# Patient Record
Sex: MALE | Race: WHITE | NOT HISPANIC OR LATINO | Employment: FULL TIME | URBAN - METROPOLITAN AREA
[De-identification: names, ages, dates, MRNs, and addresses within clinical notes are randomized per-mention and may not be internally consistent; named-entity substitution may affect disease eponyms.]

---

## 2018-02-14 ENCOUNTER — OFFICE VISIT CONVERTED (OUTPATIENT)
Dept: ORTHOPEDIC SURGERY | Facility: CLINIC | Age: 28
End: 2018-02-14
Attending: ORTHOPAEDIC SURGERY

## 2019-07-26 ENCOUNTER — OFFICE VISIT CONVERTED (OUTPATIENT)
Dept: FAMILY MEDICINE CLINIC | Age: 29
End: 2019-07-26
Attending: FAMILY MEDICINE

## 2019-07-26 ENCOUNTER — HOSPITAL ENCOUNTER (OUTPATIENT)
Dept: OTHER | Facility: HOSPITAL | Age: 29
Discharge: HOME | End: 2019-07-26
Attending: FAMILY MEDICINE

## 2019-07-26 LAB
C DIFF TOX B STL QL CT TISS CULT: NEGATIVE
CONV 027 TOXIN: NEGATIVE

## 2019-08-01 ENCOUNTER — OFFICE VISIT CONVERTED (OUTPATIENT)
Dept: FAMILY MEDICINE CLINIC | Age: 29
End: 2019-08-01
Attending: FAMILY MEDICINE

## 2019-08-22 ENCOUNTER — OFFICE VISIT CONVERTED (OUTPATIENT)
Dept: FAMILY MEDICINE CLINIC | Age: 29
End: 2019-08-22
Attending: FAMILY MEDICINE

## 2019-11-26 ENCOUNTER — OFFICE VISIT CONVERTED (OUTPATIENT)
Dept: FAMILY MEDICINE CLINIC | Age: 29
End: 2019-11-26
Attending: FAMILY MEDICINE

## 2020-01-09 ENCOUNTER — OFFICE VISIT CONVERTED (OUTPATIENT)
Dept: FAMILY MEDICINE CLINIC | Age: 30
End: 2020-01-09
Attending: FAMILY MEDICINE

## 2020-04-15 ENCOUNTER — OFFICE VISIT CONVERTED (OUTPATIENT)
Dept: FAMILY MEDICINE CLINIC | Age: 30
End: 2020-04-15
Attending: FAMILY MEDICINE

## 2020-10-16 ENCOUNTER — OFFICE VISIT CONVERTED (OUTPATIENT)
Dept: FAMILY MEDICINE CLINIC | Age: 30
End: 2020-10-16
Attending: FAMILY MEDICINE

## 2020-10-23 ENCOUNTER — CONVERSION ENCOUNTER (OUTPATIENT)
Dept: FAMILY MEDICINE CLINIC | Age: 30
End: 2020-10-23

## 2020-10-24 ENCOUNTER — HOSPITAL ENCOUNTER (OUTPATIENT)
Dept: OTHER | Facility: HOSPITAL | Age: 30
Discharge: HOME | End: 2020-10-24
Attending: FAMILY MEDICINE

## 2020-10-27 LAB — SARS-COV-2 RNA SPEC QL NAA+PROBE: DETECTED

## 2021-05-16 VITALS — RESPIRATION RATE: 16 BRPM | WEIGHT: 290 LBS | BODY MASS INDEX: 39.28 KG/M2 | HEIGHT: 72 IN

## 2021-05-18 NOTE — PROGRESS NOTES
"Centers, Duane AJamila  1990     Office/Outpatient Visit    Visit Date: Thu, Jan 9, 2020 03:37 pm    Provider: Kari Zavaleta MD (Assistant: Tania Garces MA)    Location: [facility]        Electronically signed by Kari Zavaleta MD on  01/09/2020 04:29:11 PM                             Subjective:        CC: Robel is a 29 year old White male.  Patient presents today for one month follow up         HPI: Robel is here today to follow up on depression.  He was started on Lexapro at his annual physical last month after triggering a positive depression screen.  He first started having trouble with depression in his teens.  Has been on Prozac in the past but without much effect.  \"Some days feel normal and other days are still rachana down.\"  +Insomnia.  +depressed mood.  Motivation has been fine.  Concentration good.  No feelings of guilt or worthlessness.  No anhedonia.  Appetite is \"some days normal, some days not hungry.\"  Energy has been fine.  No SI.    ROS:     CONSTITUTIONAL:  Negative for fatigue and fever.      EYES:  Negative for blurred vision.      E/N/T:  Negative for diminished hearing, nasal congestion and bleeding gums.      CARDIOVASCULAR:  Negative for chest pain and palpitations.      RESPIRATORY:  Positive for dyspnea ( with heavy exertion ).   Negative for recent cough or pleuritic chest pain.      GASTROINTESTINAL:  Negative for abdominal pain, acid reflux symptoms, constipation, diarrhea, hematochezia, nausea and vomiting.      MUSCULOSKELETAL:  Negative for arthralgias and myalgias.      PSYCHIATRIC:  Positive for anxiety, depression and sleep disturbance.   Negative for crying spells, anhedonia, difficulty concentrating or suicidal thoughts.          Past Medical History / Family History / Social History:         Last Reviewed on 1/09/2020 04:21 PM by Kari Zavaleta    Past Medical History:             PAST MEDICAL HISTORY         Chicken pox     Hospitalizations: Never        "  Surgical History:     NONE         Family History:     Father: Coronary Artery Disease; Hypertension; Hyperlipidemia; Myocardial Infarction; OTHER (enter); Factor 5;  blood clots, pulmonary embolism     Mother: pulmonary embolism;  Depression;  Factor 5     Paternal Grandfather: Heart disease     Maternal Grandfather: Cancer (unspecified type)     Maternal Grandmother: Cancer (unspecified type);  Type 2 Diabetes         Social History:     Occupation: [employer], maintenance work;     Marital Status: Single     Children: None         Tobacco/Alcohol/Supplements:     Last Reviewed on 1/09/2020 04:21 PM by Kari Zavaleta    Tobacco: He has never smoked.          Alcohol: Patient has a past history of alcoholism.  His last drink was 84 days.      Caffeine:  He admits to consuming caffeine via tea ( 1 serving per day ).          Substance Abuse History:     Last Reviewed on 1/09/2020 04:21 PM by Kari Zavaleta    NEGATIVE         Mental Health History:     Last Reviewed on 1/09/2020 04:21 PM by Kari Zavaleta        Communicable Diseases (eg STDs):     Last Reviewed on 1/09/2020 04:21 PM by Kari Zavaleta        Current Problems:     Last Reviewed on 11/26/2019 03:54 PM by Kari Zavaleta    Other pulmonary embolism without acute cor pulmonale    Other primary thrombophilia    Encounter for general adult medical examination without abnormal findings    Encounter for screening for depression    Encounter for immunization    Major depressive disorder, recurrent, mild        Immunizations:     influenza, injectable, quadrivalent, preservative free (FLUZONE QUAD 1641-9497) 11/26/2019    DTaP 1992    DTaP 1992    DTaP 5/19/1993    DTaP 1992    DTaP 4/11/1997    Td adult 5/12/2003    Td adult 11/30/2015    PedvaxHIB (Hib PRP-OMP) 1992    PedvaxHIB (Hib PRP-OMP) 1992    PedvaxHIB (Hib PRP-OMP) 1992    PedvaxHIB (Hib PRP-OMP) 5/19/1993    Hep B (pedi/adol, 3-dose schedule)  6/19/2001    Hep B (pedi/adol, 3-dose schedule) 7/19/2001    Hep B (pedi/adol, 3-dose schedule) 2/6/2002    OPV  Poliovirus, live (oral) 1992    OPV  Poliovirus, live (oral) 1992    OPV  Poliovirus, live (oral) 5/19/1993    OPV  Poliovirus, live (oral) 4/11/1997    MMR  (Measles-Mumps-Rubella), live 5/19/1993    MMR  (Measles-Mumps-Rubella), live 4/11/1997        Allergies:     Last Reviewed on 11/26/2019 03:54 PM by Kari Zavaleta    No Known Allergies.        Current Medications:     Last Reviewed on 11/26/2019 03:54 PM by Kari Zavaleta    Flonase Allergy Relief  [once daily]    ZyrTEC  [po q daily]    escitalopram oxalate 5 mg oral tablet [take 1 tablet (5 mg) by oral route once daily]        Objective:        Vitals:         Current: 1/9/2020 3:40:42 PM    Ht:  5 ft, 10.5 in;  Wt: 304.6 lbs;  BMI: 43.1T: 98.7 F (oral);  BP: 138/88 mm Hg (left arm, sitting);  P: 97 bpm (left arm (BP Cuff), sitting);  sCr: 0.89 mg/dL;  GFR: 150.56        Exams:     PHYSICAL EXAM:     GENERAL: Vitals recorded well developed, well nourished;  well groomed;  no apparent distress;     EYES: extraocular movements intact; conjunctiva and cornea are normal; PERRLA;     E/N/T: OROPHARYNX:  normal mucosa, dentition, gingiva, and posterior pharynx;     RESPIRATORY: normal respiratory rate and pattern with no distress;     MUSCULOSKELETAL: normal gait; normal overall tone     PSYCHIATRIC: appropriate affect and demeanor; normal psychomotor function; normal speech pattern;         Assessment:         F33.0   Major depressive disorder, recurrent, mild       Z13.31   Encounter for screening for depression           ORDERS:         Meds Prescribed:       [Refilled] escitalopram oxalate 10 mg oral tablet [take 1 tablet (10 mg) by oral route once daily], #30 (thirty) tablets, Refills: 2 (two)         Lab Orders:       APPTO  Appointment need  (In-House)              Other Orders:         Depression screen positive and follow up  plan documented  (In-House)                      Plan:         Major depressive disorder, recurrent, mildSx improved but not yet with optimal sx control.  Still having trouble with insomnia and appetite issues.  Overall feels much better.  No adverse effects.  Increasing Lexapro to 10 mg and I will see him back in 3 months or sooner prn.    MIPS PHQ-9 Depression Screening: Completed form scanned and in chart; Total Score 7 Positive Depression Screen: Pharmacologic intervention initiated/modified     FOLLOW-UP: Schedule a follow-up visit in 3 months.:.  f/u depression with [facility]          Prescriptions:       [Refilled] escitalopram oxalate 10 mg oral tablet [take 1 tablet (10 mg) by oral route once daily], #30 (thirty) tablets, Refills: 2 (two)           Orders:       APPTO  Appointment need  (In-House)              Depression screen positive and follow up plan documented  (In-House)                  Patient Recommendations:        For  Major depressive disorder, recurrent, mild:    Schedule a follow-up visit in 3 months.                APPOINTMENT INFORMATION:        Monday Tuesday Wednesday Thursday Friday Saturday Sunday            Time:___________________AM  PM   Date:_____________________             Charge Capture:         Primary Diagnosis:     F33.0  Major depressive disorder, recurrent, mild           Orders:      04170  Office/outpatient visit; established patient, level 3  (In-House)            APPTO  Appointment need  (In-House)              Depression screen positive and follow up plan documented  (In-House)              Z13.31  Encounter for screening for depression

## 2021-05-18 NOTE — PROGRESS NOTES
"Centers, Duane AJamila  1990     Office/Outpatient Visit    Visit Date: Wed, Apr 15, 2020 03:41 pm    Provider: Kari Zavaleta MD (Assistant: Tania Garces MA)    Location: [facility]        Electronically signed by Kari Zavaleta MD on  04/15/2020 04:04:24 PM                             Subjective:        CC: Robel is a 30 year old White male.  Three month follow up         HPI: Robel's telehealth visit today is for f/u on depression.  We increased his Lexapro at last visit from 5 mg to 10 mg since he was having some ongoing issues with insomnia and depressed mood.  He initially had trouble with depression starting in his teens.  He has been on Prozac in the past but without much effect.  He feels much better.  Still having intermittent insomnia.  However, mood \"is much better.\"    ROS:     CONSTITUTIONAL:  Negative for fatigue and fever.      CARDIOVASCULAR:  Negative for chest pain and palpitations.      RESPIRATORY:  Negative for recent cough, dyspnea ( back to normal ) and pleuritic chest pain.      MUSCULOSKELETAL:  Negative for arthralgias and myalgias.      PSYCHIATRIC:  Positive for anxiety, depression and sleep disturbance.   Negative for crying spells, anhedonia, difficulty concentrating or suicidal thoughts.          Past Medical History / Family History / Social History:         Last Reviewed on 4/15/2020 03:55 PM by Kari Zavaleta    Past Medical History:             PAST MEDICAL HISTORY         Chicken pox     Hospitalizations: Never         Surgical History:     NONE         Family History:     Father: Coronary Artery Disease; Hypertension; Hyperlipidemia; Myocardial Infarction; OTHER (enter); Factor 5;  blood clots, pulmonary embolism     Mother: pulmonary embolism;  Depression;  Factor 5     Paternal Grandfather: Heart disease     Maternal Grandfather: Cancer (unspecified type)     Maternal Grandmother: Cancer (unspecified type);  Type 2 Diabetes         Social History:     " Occupation: [employer], maintenance work;     Marital Status: Single     Children: None         Tobacco/Alcohol/Supplements:     Last Reviewed on 4/15/2020 03:55 PM by Kari Zavaleta    Tobacco: He has never smoked.          Alcohol: Patient has a past history of alcoholism.  His last drink was 84 days.      Caffeine:  He admits to consuming caffeine via tea ( 1 serving per day ).          Substance Abuse History:     Last Reviewed on 4/15/2020 03:55 PM by Kari Zavaleta    NEGATIVE         Mental Health History:     Last Reviewed on 4/15/2020 03:55 PM by Kari Zavaleta        Communicable Diseases (eg STDs):     Last Reviewed on 4/15/2020 03:55 PM by Kari Zavaleta        Current Problems:     Last Reviewed on 1/09/2020 04:21 PM by Kari Zavaleta    Other pulmonary embolism without acute cor pulmonale    Other primary thrombophilia    Encounter for general adult medical examination without abnormal findings    Encounter for immunization    Encounter for screening for depression    Major depressive disorder, recurrent, mild        Immunizations:     influenza, injectable, quadrivalent, preservative free (FLUZONE QUAD 3140-5809) 11/26/2019    DTaP 1992    DTaP 1992    DTaP 5/19/1993    DTaP 1992    DTaP 4/11/1997    Td adult 5/12/2003    Td adult 11/30/2015    PedvaxHIB (Hib PRP-OMP) 1992    PedvaxHIB (Hib PRP-OMP) 1992    PedvaxHIB (Hib PRP-OMP) 1992    PedvaxHIB (Hib PRP-OMP) 5/19/1993    Hep B (pedi/adol, 3-dose schedule) 6/19/2001    Hep B (pedi/adol, 3-dose schedule) 7/19/2001    Hep B (pedi/adol, 3-dose schedule) 2/6/2002    OPV  Poliovirus, live (oral) 1992    OPV  Poliovirus, live (oral) 1992    OPV  Poliovirus, live (oral) 5/19/1993    OPV  Poliovirus, live (oral) 4/11/1997    MMR  (Measles-Mumps-Rubella), live 5/19/1993    MMR  (Measles-Mumps-Rubella), live 4/11/1997        Allergies:     Last Reviewed on 4/15/2020 03:41 PM by Tania Garces  Known Allergies.        Current Medications:     Last Reviewed on 4/15/2020 03:41 PM by Tania Garces Allergy Relief  [once daily]    ZyrTEC  [po q daily]    escitalopram oxalate 10 mg oral tablet [take 1 tablet (10 mg) by oral route once daily]        Objective:        Exams:     PHYSICAL EXAM:     GENERAL: well developed, well nourished;  well groomed;  no apparent distress;     EYES: extraocular movements intact; conjunctiva and cornea are normal;     E/N/T: OROPHARYNX:  normal mucosa, dentition, gingiva, and posterior pharynx;     RESPIRATORY: normal respiratory rate and pattern with no distress;     MUSCULOSKELETAL: normal overall tone     NEUROLOGIC: mental status: alert and oriented x 3;     PSYCHIATRIC: appropriate affect and demeanor; normal psychomotor function; normal speech pattern; normal thought and perception;         Assessment:         F33.0   Major depressive disorder, recurrent, mild           ORDERS:         Meds Prescribed:       [Refilled] escitalopram oxalate 10 mg oral tablet [take 1 tablet (10 mg) by oral route once daily], #90 (ninety) tablets, Refills: 1 (one)         Lab Orders:       APPTO  Appointment need  (In-House)                      Plan:         Major depressive disorder, recurrent, mildAlan is doing great.  The Lexapro is working very well and he is extremely happy with his symptom control.  Refills sent for 90 DS with 1 RF.  He is still having some occasional insomnia; recommend getting some melatonin OTC and trying that prn for the nights when he has trouble.  I will plan to see him back in 6 months or sooner prn.    Telehealth: Verbal consent obtained for visit to occur via televideo conferencing; Staff, other than provider, present during telephone visit include Tania Garces MA     FOLLOW-UP: Schedule a follow-up visit in 6 months.:.  f/u depression with [facility]          Prescriptions:       [Refilled] escitalopram oxalate 10 mg oral tablet [take 1 tablet (10  mg) by oral route once daily], #90 (ninety) tablets, Refills: 1 (one)           Orders:       APPTO  Appointment need  (In-House)                  Patient Recommendations:        For  Major depressive disorder, recurrent, mild:    Schedule a follow-up visit in 6 months.                APPOINTMENT INFORMATION:        Monday Tuesday Wednesday Thursday Friday Saturday Sunday            Time:___________________AM  PM   Date:_____________________             Charge Capture:         Primary Diagnosis:     F33.0  Major depressive disorder, recurrent, mild           Orders:      [id number]  Office/outpatient visit; established patient, level 3  (In-House)            APPTO  Appointment need  (In-House)

## 2021-05-18 NOTE — PROGRESS NOTES
"Centers, Duane AJamila  1990     Office/Outpatient Visit    Visit Date: Fri, Oct 16, 2020 04:39 pm    Provider: Kari Zavaleta MD (Assistant: Maximino Pal, )    Location: [facility]        Electronically signed by Kari Zavaleta MD on  10/16/2020 04:56:36 PM                             Subjective:        CC: Robel is a 30 year old White male.  This is a follow-up visit.  Pump[employer] video [id number]        HPI: Robel's telehealth visit today is for follow-up on chronic issues.        He is on Lexapro for depression and insomnia.  He has had problems for years but only recently sought treatment.  He has been on Prozac previously but did not get much relief.  He is doing better, but he \"still has some days every once in a while that don't feel right.\"  Those days come maybe 2x per week.  Motivation has been good.  Mood \"overall has been pretty good.\"  He does feel \"on edge\" those couple days a week but not down or depressed.  Sleeping well intermittently; the nights he has trouble are mostly the nights of the days where he feels \"on edge.\"        It has been over a year now since his PE.  Over the summer when he was out in the heat on really hot days, he did notice some difficulty breathing, but otherwise doing fine.  He has been off the blood thinner now for at least 6 months.  No issues.    ROS:     CONSTITUTIONAL:  Negative for fatigue and fever.      CARDIOVASCULAR:  Negative for chest pain and palpitations.      RESPIRATORY:  Negative for recent cough, dyspnea ( back to normal ) and pleuritic chest pain.      MUSCULOSKELETAL:  Negative for arthralgias and myalgias.      PSYCHIATRIC:  Positive for sleep disturbance.   Negative for anxiety, crying spells, depression, anhedonia, difficulty concentrating or suicidal thoughts.          Past Medical History / Family History / Social History:         Last Reviewed on 10/16/2020 04:51 PM by Kari Zavaleta    Past Medical History:             PAST " MEDICAL HISTORY         Chicken pox     Hospitalizations: Never         Surgical History:     NONE         Family History:     Father: Coronary Artery Disease; Hypertension; Hyperlipidemia; Myocardial Infarction; OTHER (enter); Factor 5;  blood clots, pulmonary embolism     Mother: pulmonary embolism;  Depression;  Factor 5     Paternal Grandfather: Heart disease     Maternal Grandfather: Cancer (unspecified type)     Maternal Grandmother: Cancer (unspecified type);  Type 2 Diabetes         Social History:     Occupation: [employer], maintenance work;     Marital Status: Single     Children: None         Tobacco/Alcohol/Supplements:     Last Reviewed on 10/16/2020 04:51 PM by Kari Zavaleta    Tobacco: He has never smoked.          Alcohol: Patient has a past history of alcoholism.  His last drink was 84 days.      Caffeine:  He admits to consuming caffeine via tea ( 1 serving per day ).          Substance Abuse History:     Last Reviewed on 10/16/2020 04:51 PM by Kari Zavaleta    NEGATIVE         Mental Health History:     Last Reviewed on 10/16/2020 04:51 PM by Kari Zavaleta        Communicable Diseases (eg STDs):     Last Reviewed on 10/16/2020 04:51 PM by Kari Zavaleta        Current Problems:     Last Reviewed on 4/15/2020 03:55 PM by Kari Zavaleta    Other primary thrombophilia    Other pulmonary embolism without acute cor pulmonale    Major depressive disorder, recurrent, mild        Immunizations:     influenza, injectable, quadrivalent, preservative free (FLUZONE QUAD [id number]) 11/26/2019    DTaP 1992    DTaP 1992    DTaP 5/19/1993    DTaP 1992    DTaP 4/11/1997    Td adult 5/12/2003    Td adult 11/30/2015    PedvaxHIB (Hib PRP-OMP) 1992    PedvaxHIB (Hib PRP-OMP) 1992    PedvaxHIB (Hib PRP-OMP) 1992    PedvaxHIB (Hib PRP-OMP) 5/19/1993    Hep B (pedi/adol, 3-dose schedule) 6/19/2001    Hep B (pedi/adol, 3-dose schedule) 7/19/2001    Hep B (pedi/adol,  3-dose schedule) 2/6/2002    OPV  Poliovirus, live (oral) 1992    OPV  Poliovirus, live (oral) 1992    OPV  Poliovirus, live (oral) 5/19/1993    OPV  Poliovirus, live (oral) 4/11/1997    MMR  (Measles-Mumps-Rubella), live 5/19/1993    MMR  (Measles-Mumps-Rubella), live 4/11/1997        Allergies:     Last Reviewed on 4/15/2020 03:55 PM by Kari Zavaleta    No Known Allergies.        Current Medications:     Last Reviewed on 4/15/2020 03:55 PM by Kari Zavaleta    Flonase Allergy Relief  [once daily]    ZyrTEC  [po q daily]    escitalopram oxalate 10 mg oral tablet [take 1 tablet (10 mg) by oral route once daily]        Objective:        Exams:     PHYSICAL EXAM:     GENERAL: well developed, well nourished;  well groomed;  no apparent distress;     EYES: extraocular movements intact; conjunctiva and cornea are normal; PERRLA;     RESPIRATORY: normal respiratory rate and pattern with no distress;     MUSCULOSKELETAL: normal overall tone     NEUROLOGIC: mental status: alert and oriented x 3; reflexes: brachioradialis: 2+; knee jerks: 2+;     PSYCHIATRIC: appropriate affect and demeanor; normal psychomotor function; normal speech pattern;         Assessment:         F33.0   Major depressive disorder, recurrent, mild       I26.99   Other pulmonary embolism without acute cor pulmonale       D68.59   Other primary thrombophilia           ORDERS:         Meds Prescribed:       [Refilled] escitalopram oxalate 20 mg oral tablet [take 1 tablet (20 mg) by oral route once daily], #90 (ninety) tablets, Refills: 3 (three)         Lab Orders:       APPTO  Appointment need  (In-House)                      Plan:         Major depressive disorder, recurrent, mildDoing pretty well on the increase of Lexapro to 10 mg but still has at least a couple days per week with irritability and insomnia.  Increasing dose to 20 mg.  I will see him back in 6 months for PEx or sooner prn.    Telehealth: Verbal consent obtained for visit  to occur via televideo conferencing; Staff, other than provider, present during telephone visit include Maximino Dasilva MA     FOLLOW-UP: Schedule a follow-up visit in 6 months.:.  annual physical 30 min with Meghann          Prescriptions:       [Refilled] escitalopram oxalate 20 mg oral tablet [take 1 tablet (20 mg) by oral route once daily], #90 (ninety) tablets, Refills: 3 (three)           Orders:       APPTO  Appointment need  (In-House)              Other pulmonary embolism without acute cor pulmonaleDoing well.  No recurrence.  Sx are minimal and only in extreme conditions.  Not currently experiencing any problems.        Other primary thrombophiliaCleared by Hematology to come off of blood thinners.              Patient Recommendations:        For  Major depressive disorder, recurrent, mild:    Schedule a follow-up visit in 6 months.                APPOINTMENT INFORMATION:        Monday Tuesday Wednesday Thursday Friday Saturday Sunday            Time:___________________AM  PM   Date:_____________________             Charge Capture:         Primary Diagnosis:     F33.0  Major depressive disorder, recurrent, mild           Orders:      [id number]  Office/outpatient visit; established patient, level 4  (In-House)            APPTO  Appointment need  (In-House)              I26.99  Other pulmonary embolism without acute cor pulmonale     D68.59  Other primary thrombophilia

## 2021-05-18 NOTE — PROGRESS NOTES
Kevyn, Duane AJamila 1990     Office/Outpatient Visit    Visit Date: Thu, Aug 22, 2019 09:36 am    Provider: Kari Zavaleta MD (Assistant: Tania Garces MA)    Location: [facility]        Electronically signed by Kari Zavaleta MD on  08/22/2019 12:28:37 PM                             SUBJECTIVE:        CC:     Robel is a 29 year old White male.  Patient presents today for three week follow up         HPI: Robel is here today to follow up PE.  Briefly, he was diagnosed 7/23/19 with PE and found to be positive for MTHFR.  He started Xarelto and is doing fine with that.  Was originaly started on Percocet for pain, then transitioned to tramadol 3 weeks ago.  He has been off of work on FMLA primarily for dyspnea with exertion.  Referral was placed to Hematology due to family history of multiple different primary coagulopathies in first degree relatives.         His chest pain is pretty much resolved at this point.  He does still feel some pain if he pushes himself too much.  He is using some of the tramadol just every once in a while.  The SOB is improving.  For the past week, he has been mowing, weed-eating, and setting fence posts.  He is ready to go back to work.  No bleeding complaints -- tolerating the Xarelto well.        He is going to see the hematologist this afternoon.     ROS:     CONSTITUTIONAL:  Negative for fatigue and fever.      EYES:  Negative for blurred vision.      E/N/T:  Negative for diminished hearing, nasal congestion and bleeding gums.      CARDIOVASCULAR:  Positive for chest pain.   Negative for palpitations.      RESPIRATORY:  Positive for dyspnea ( with heavy exertion ).   Negative for recent cough or pleuritic chest pain.      GASTROINTESTINAL:  Negative for abdominal pain, acid reflux symptoms, constipation, diarrhea, hematochezia, nausea and vomiting.      GENITOURINARY:  Negative for hematuria.      MUSCULOSKELETAL:  Negative for arthralgias and myalgias.           PMH/FMH/SH:     Last Reviewed on 8/22/2019 09:51 AM by Kari Zavaleta    Past Medical History:             PAST MEDICAL HISTORY         Chicken pox     Hospitalizations: Never         Surgical History:     NONE         Family History:     Father: Coronary Artery Disease; Hypertension; Hyperlipidemia; Myocardial Infarction; OTHER (enter); Factor 5;  blood clots, pulmonary embolism     Mother: pulmonary embolism;  Depression;  Factor 5     Paternal Grandfather: Heart disease     Maternal Grandfather: Cancer (unspecified type)     Maternal Grandmother: Cancer (unspecified type);  Type 2 Diabetes         Social History:     Occupation: [employer], maintenance work;     Marital Status: Single     Children: None         Tobacco/Alcohol/Supplements:     Last Reviewed on 8/22/2019 09:51 AM by Kari Zavaleta    Tobacco: He has never smoked.          Alcohol: Patient has a past history of alcoholism.  His last drink was 84 days.      Caffeine:  He admits to consuming caffeine via tea ( 1 serving per day ).          Substance Abuse History:     Last Reviewed on 8/22/2019 09:51 AM by Kari Zavaleta    NEGATIVE         Mental Health History:     Last Reviewed on 8/22/2019 09:51 AM by Kari Zavaleta        Communicable Diseases (eg STDs):     Last Reviewed on 8/22/2019 09:51 AM by Kari Zavaleta            Current Problems:     Last Reviewed on 8/01/2019 03:15 PM by Kari Zavaleta    Use of high risk medications     Primary hypercoagulable state     Tachycardia, NOS     Anxiety with depression     Mid back pain     Dizziness     Epigastric abdominal pain     Diarrhea     Chest pain, other type     Pulmonary embolism and infarction, other     Screening for depression         Immunizations:     DTaP 1992     DTaP 1992     DTaP 5/19/1993     DTaP 1992     DTaP 4/11/1997     Td adult 5/12/2003     Td adult 11/30/2015     PedvaxHIB (Hib PRP-OMP) 1992     PedvaxHIB (Hib PRP-OMP) 1992      PedvaxHIB (Hib PRP-OMP) 1992     PedvaxHIB (Hib PRP-OMP) 5/19/1993     Hep B (pedi/adol, 3-dose schedule) 6/19/2001     Hep B (pedi/adol, 3-dose schedule) 7/19/2001     Hep B (pedi/adol, 3-dose schedule) 2/6/2002     OPV  Poliovirus, live (oral) 1992     OPV  Poliovirus, live (oral) 1992     OPV  Poliovirus, live (oral) 5/19/1993     OPV  Poliovirus, live (oral) 4/11/1997     MMR  (Measles-Mumps-Rubella), live 5/19/1993     MMR  (Measles-Mumps-Rubella), live 4/11/1997         Allergies:     Last Reviewed on 8/01/2019 03:15 PM by Kari Zavaleta      No Known Drug Allergies.         Current Medications:     Last Reviewed on 8/01/2019 03:15 PM by Kari Zavaleta    Tramadol 50mg Tablet 1-2 po BID PRN     Xarelto 15mg Tablet Take 1 tablet(s) by mouth bid         OBJECTIVE:        Vitals:         Current: 8/22/2019 9:40:00 AM    Ht:  5 ft, 10.5 in;  Wt: 300.4 lbs;  BMI: 42.5    T: 99.4 F (oral);  BP: 140/90 mm Hg (left arm, sitting);  P: 104 bpm (left arm (BP Cuff), sitting);  sCr: 0.89 mg/dL;  GFR: 149.68        Repeat:     10:04:51 AM     BP:   138/75mm Hg (left arm, sitting)         Exams:     PHYSICAL EXAM:     GENERAL: Vitals recorded well developed, well nourished;  well groomed;  no apparent distress;     EYES: extraocular movements intact; conjunctiva and cornea are normal; PERRLA;     E/N/T: OROPHARYNX:  normal mucosa, dentition, gingiva, and posterior pharynx;     RESPIRATORY: normal respiratory rate and pattern with no distress; normal breath sounds with no rales, rhonchi, wheezes or rubs;     CARDIOVASCULAR: normal rate; rhythm is regular;  no systolic murmur; no edema;     MUSCULOSKELETAL: normal gait; normal overall tone         ASSESSMENT           415.19   I26.99  Pulmonary embolism and infarction, other              DDx:     289.81   D68.59  Primary hypercoagulable state              DDx:         ORDERS:         Lab Orders:       APPTO  Appointment need  (In-House)                   PLAN:           Pulmonary embolism and infarction, other Robel is doing great.  Chest and SOB have essentially resolved (although he still has mild pain or SOB if he pushes himself very hard).  He is using the tramadol just every once in a while now.  Doing well on the Xarelto.  No refills needed.  No bleeding complaints.  He is ready to go back to work -- will release him back, work note given.  RTC 3 months or sooner prn.     MIPS Vaccines Flu and Pneumonia updated in Shot record     FOLLOW-UP: Schedule a follow-up visit in 3 months..  f/u PE with [facility]           Orders:       APPTO  Appointment need  (In-House)            Primary hypercoagulable state Likely he will need to continue Xarelto lifelong for MTHFR mutation.  No refills needed today.  No bleeding complaints.  He is going to see the hematologist Dr. Barreto at [facility] this afternoon.  Requested he have Dr. Barreto fax me his records.             Patient Recommendations:        For  Pulmonary embolism and infarction, other:     Schedule a follow-up visit in 3 months.                APPOINTMENT INFORMATION:        Monday Tuesday Wednesday Thursday Friday Saturday Sunday            Time:___________________AM  PM   Date:_____________________             CHARGE CAPTURE           **Please note: ICD descriptions below are intended for billing purposes only and may not represent clinical diagnoses**        Primary Diagnosis:         415.19 Pulmonary embolism and infarction, other            I26.99    Other pulmonary embolism without acute cor pulmonale              Orders:          66989   Office/outpatient visit; established patient, level 4  (In-House)             APPTO   Appointment need  (In-House)           289.81 Primary hypercoagulable state            D68.59    Other primary thrombophilia

## 2021-05-18 NOTE — PROGRESS NOTES
Michael Bagleyane LOIS 1990     Office/Outpatient Visit    Visit Date: Fri, Jul 26, 2019 11:17 am    Provider: Kari Zavaleta MD (Assistant: Tania Garces MA)    Location: [facility]        Electronically signed by Kari Zavaleta MD on  07/26/2019 12:38:33 PM                             SUBJECTIVE:        CC:     Robel is a 29 year old White male.  He is here today following a transition of care from an inpatient hospital: [place]. The patient was admitted on 07/23/2019. The patient was admitted for back pain. During the patient's hospital stay the patient was treated by Dr. Dobbins. Medications have been reviewed and reconciled with discharge summary..  He was seen in the office within 48 hours of discharge from the hospital.         HPI: Robel is here today for ERYN appt after being admitted to [facility] with PE from 7/23/19 to 7/25/19.  Lab testing from admission showed a positive MTHFR.  He was started on Xarelto and discharged home as well with some Percocet for chest pain presumably secondary to the blood clot.  He is now taking Xarelto 15 mg BID for 3 weeks, then going to 20 mg daily.          He woke up Tuesday morning with some back pain and thought he had pulled a muscle.  He was also very short of breath so went to the ED.  He was diagnosed with PE.  Transferred to [facility].  He was initially on a heparin gtt.   He was diagnosed with effusion around the pulmonary infarct.  +Fever throughout his stay.  Blood cultures were drawn and negative.  Since getting home, Tmax 99.1.  He had some Toradol in the hospital and ever since the Toradol, his stomach has been hurting with diarrhea.  No melena, no BRBPR.          He has a very complicated family history of thrombophilia.  Henrietta DUBON who is a close family friend reports to me that his sister was initially diagnosed with multiple bilateral PEs after starting Nuvaring.  She had an abnormal DRVVT and elevated factor VIII on testing.  She  completed 6 months of Xarelto and d/clare Nuvaring with no issues since.  His mom was diagnosed with PE in the context of PNA; diagnosed with elevated anticardiolipin and started on lifelong Xarelto.  His dad was diagnosed with PE subsequently with elevated lupus anticoagulant and started on lifelong Eliquis.         He works for [employer] -- it is a  highly physical job.  He is worried about going back to full duty right away.         PHQ-9 Depression Screening: Completed form scanned and in chart; Total Score 3 Alcohol Consumption Screening: Completed form scanned and in chart; Total Score 0     ROS:     CONSTITUTIONAL:  Positive for fatigue and fever ( subjective (Tmax 99.1) ).      EYES:  Negative for blurred vision.      E/N/T:  Negative for diminished hearing and nasal congestion.      CARDIOVASCULAR:  Positive for chest pain.   Negative for palpitations.      RESPIRATORY:  Negative for recent cough and dyspnea.      GASTROINTESTINAL:  Positive for abdominal pain and diarrhea.   Negative for acid reflux symptoms, constipation, nausea or vomiting.      MUSCULOSKELETAL:  Negative for arthralgias and myalgias.      NEUROLOGICAL:  Negative for paresthesias and weakness.      PSYCHIATRIC:  Positive for anxiety and feelings of stress.   Negative for depression or sleep disturbance.          PMH/FMH/SH:     Last Reviewed on 7/26/2019 11:34 AM by Kari Zavaleta    Past Medical History:             PAST MEDICAL HISTORY         Chicken pox     Hospitalizations: Never         Surgical History:     NONE         Family History:     Father: Coronary Artery Disease; Hypertension; Hyperlipidemia; Myocardial Infarction; OTHER (enter); Factor 5;  blood clots, pulmonary embolism     Mother: pulmonary embolism;  Depression;  Factor 5     Paternal Grandfather: Heart disease     Maternal Grandfather: Cancer (unspecified type)     Maternal Grandmother: Cancer (unspecified type);  Type 2 Diabetes         Social  History:     Occupation: [employer], maintenance work;     Marital Status: Single     Children: None         Tobacco/Alcohol/Supplements:     Last Reviewed on 7/26/2019 11:34 AM by Kari Zavaleta    Tobacco: He has never smoked.          Alcohol: Patient has a past history of alcoholism.  His last drink was 84 days.      Caffeine:  He admits to consuming caffeine via tea ( 1 serving per day ).          Substance Abuse History:     Last Reviewed on 7/26/2019 11:34 AM by Kari Zavaleta    NEGATIVE         Mental Health History:     Last Reviewed on 7/26/2019 11:34 AM by Kari Zavaleta        Communicable Diseases (eg STDs):     Last Reviewed on 7/26/2019 11:34 AM by Kari Zavaleta            Current Problems:     Last Reviewed on 11/30/2017 02:27 PM by Callie West    Tachycardia, NOS     Anxiety with depression     Mid back pain     Dizziness         Immunizations:     DTaP 1992     DTaP 1992     DTaP 5/19/1993     DTaP 1992     DTaP 4/11/1997     Td adult 5/12/2003     Td adult 11/30/2015     PedvaxHIB (Hib PRP-OMP) 1992     PedvaxHIB (Hib PRP-OMP) 1992     PedvaxHIB (Hib PRP-OMP) 1992     PedvaxHIB (Hib PRP-OMP) 5/19/1993     Hep B (pedi/adol, 3-dose schedule) 6/19/2001     Hep B (pedi/adol, 3-dose schedule) 7/19/2001     Hep B (pedi/adol, 3-dose schedule) 2/6/2002     OPV  Poliovirus, live (oral) 1992     OPV  Poliovirus, live (oral) 1992     OPV  Poliovirus, live (oral) 5/19/1993     OPV  Poliovirus, live (oral) 4/11/1997     MMR  (Measles-Mumps-Rubella), live 5/19/1993     MMR  (Measles-Mumps-Rubella), live 4/11/1997         Allergies:     Last Reviewed on 11/30/2017 02:27 PM by Callie West      No Known Drug Allergies.         Current Medications:     Last Reviewed on 11/30/2017 02:27 PM by Callie West    None        OBJECTIVE:        Vitals:         Current: 7/26/2019 11:25:19 AM    Ht:  5 ft, 10.5 in;  Wt: 296.8 lbs;  BMI: 42.0    T: 98 F (oral);  BP:  142/95 mm Hg (left arm, standing);  P: 112 bpm (left arm (BP Cuff), standing);  sCr: 0.89 mg/dL;  GFR: 148.91        Exams:     PHYSICAL EXAM:     GENERAL: Vitals recorded well developed, well nourished;  well groomed;  no apparent distress;     EYES: extraocular movements intact; conjunctiva and cornea are normal; PERRLA;     E/N/T: OROPHARYNX:  normal mucosa, dentition, gingiva, and posterior pharynx;     RESPIRATORY: normal respiratory rate and pattern with no distress; normal breath sounds with no rales, rhonchi, wheezes or rubs;     CARDIOVASCULAR: normal rate; rhythm is regular;  no systolic murmur; no edema;     GASTROINTESTINAL: nontender; normal bowel sounds;     MUSCULOSKELETAL: normal gait; normal overall tone     PSYCHIATRIC:  appropriate affect and demeanor; normal speech pattern; grossly normal memory;         Lab/Test Results:             Amphetamines Screen, Urin:  Negative (07/26/2019),     BAR-Barbiturates Screen, Urin:  Negative (07/26/2019),     Buprenorphine:  Negative (07/26/2019),     BZO-Benzodiazepines Screen,Ur:  Negative (07/26/2019),     Cocaine(Metab.)Screen, Ur:  Negative (07/26/2019),     MDMA-Ecstasy:  Negative (07/26/2019),     Met-Methamphetamine:  Negative (07/26/2019),     MTD-Methadone Screen, Urine:  Negative (07/26/2019),     Opiate Screen, Urine:  Negative (07/26/2019),     OXY-Oxycodone:  Positive (07/26/2019),     PCP-Phencyclidine Screen, Uri:  Negative (07/26/2019),     THC Cannabinoids Screen, Urin:  Negative (07/26/2019),     Urine temperature:  confirmed (07/26/2019),     Date and time of last pill:  new rx/and (07/26/2019),     Performed by:  pr (07/26/2019),     Collection Time:  12:19 (07/26/2019),             ASSESSMENT           415.19   I26.99  Pulmonary embolism and infarction, other              DDx:     289.81   D68.59  Primary hypercoagulable state              DDx:     V79.0   Z13.31  Screening for depression              DDx:     786.59   R07.89  Chest pain,  other type              DDx:     V58.69   F11.90  Use of high risk medications              DDx:     787.91   R19.7  Diarrhea              DDx:     789.06   R10.13  Epigastric abdominal pain              DDx:         ORDERS:         Meds Prescribed:       Omeprazole 40mg Capsules, Extended Release 1 capsule daily  #30 (Thirty) capsule(s) Refills: 20       Refill of: Percocet  (Oxycodone/Acetaminophen ) 10mg/325mg Tablet 1 every 8 hours prn  #21 (Twenty One) tablet(s) Refills: 0         Lab Orders:       [id number]  Drug test prsmv qual dir optical obs per day  (In-House)         93082  CDTEX - H Clostridium Difficile Toxins A & B; dna probe  (Send-Out)         APPTO  Appointment need  (In-House)           Procedures Ordered:       REFER  Referral to Specialist or Other Facility  (Send-Out)           Other Orders:         Depression screen negative  (In-House)           Negative EtOH screen  (In-House)                   PLAN:          Pulmonary embolism and infarction, other +Newly diagnosed PE.  +MTHFR mutation diagnosed at [facility].  Started on Xarelto.  He is affording this fine.  No bleeding complaints.  He is still having quite a bit of chest pain from the infarct.  The Percocet is controlling it well.  He should avoid NSAIDs.  I will keep him on the Percocet for the next week for the infarct pain but we did discuss that this medication is not intended for long-term use and as we get farther out from the acute event, we will be steadily tapering him back off of it.  He is in agreement with this.  Contract signed.  Tox screen and Fidencio run today.  Continue to faithfully use IS hourly.  Rx sent for #21 tabs as below.  Due to his unusual family history, we are going to get him in with Hematology for further evaluation.  He saw Dr. Overton while admitted up at [facility], so will get him in there for out-pt follow-up.  I will keep him out of work for the next week since his job is very physical and he is still  having a lot of pain with some dyspnea.  Will reassess at follow-up visit.     MIPS PHQ-9 Depression Screening: Completed form scanned and in chart; Total Score 3; Negative Depression Screen   Smoking cessation encouraged. Counseling for less than 3 minutes.  Negative alcohol screen     FOLLOW-UP: Schedule follow-up appointment in 6 days..  NEXT THURSDAY 3:00 PM  -- WILL KEEP HIM OFF WORK UNTIL FRIDAY OFF NEXT WEEK SO WE CAN RE-EVALUATE           Orders:       APPTO  Appointment need  (In-House)           Depression screen negative  (In-House)           Negative EtOH screen  (In-House)            Primary hypercoagulable state As above.         REFERRALS:  Referral initiated to a hematologist ( at [facility]; for evaluation of MTHFR with PE ).            Orders:       REFER  Referral to Specialist or Other Facility  (Send-Out)            Chest pain, other type As above.           Prescriptions:       Refill of: Percocet  (Oxycodone/Acetaminophen ) 10mg/325mg Tablet 1 every 8 hours prn  #21 (Twenty One) tablet(s) Refills: 0          Use of high risk medications     Controlled substance documentation: Fidencio reviewed; drug screen performed and appropriate; consent is reviewed and signed and on the chart.  He is aware of risk of addiction on this medication, understands that he will need to follow up for a review every 3 months and his medications will be adjusted or decreased as deemed appropriate at each visit.  No history of drug or alcohol abuse.  No concerns about diversion or abuse. He denies side effects related to the medication.  He is aware that he may be called in for pill counts.  The dosing of this medication will be reviewed on a regular basis and reduced if possible..  Ongoing use of a controlled substance is necessary for this patient to have a normal quality of life     Drug screen Controlled Substance Contract has been ordered           Orders:       [id number]  Drug test [facility]  qual dir optical obs per day  (In-House)            Diarrhea Checking C. diff.  Started while he was in the hospital.     LABORATORY:  Labs ordered to be performed today include C-Diff.            Orders:       04523  [facility] Clostridium Difficile Toxins A & B; dna probe  (Send-Out)            Epigastric abdominal pain Epigastric discomfort started after he got a Toradol shot for pain.  Will start him on omeprazole and also recommend that he starts a probiotic.  Will see how he is doing in a week.           Prescriptions:       Omeprazole 40mg Capsules, Extended Release 1 capsule daily  #30 (Thirty) capsule(s) Refills: 20             Patient Recommendations:        For  Pulmonary embolism and infarction, other:     Schedule a follow-up visit in 6 days.                APPOINTMENT INFORMATION:        Monday Tuesday Wednesday Thursday Friday Saturday Sunday            Time:___________________AM  PM   Date:_____________________             CHARGE CAPTURE           **Please note: ICD descriptions below are intended for billing purposes only and may not represent clinical diagnoses**        Primary Diagnosis:         415.19 Pulmonary embolism and infarction, other            I26.99    Other pulmonary embolism without acute cor pulmonale              Orders:          [id number]   Transitional care manage service 7 day discharge  (In-House)             APPTO   Appointment need  (In-House)                Depression screen negative  (In-House)                Negative EtOH screen  (In-House)           289.81 Primary hypercoagulable state            D68.59    Other primary thrombophilia    V79.0 Screening for depression            Z13.31    Encounter for screening for depression    786.59 Chest pain, other type            R07.89    Other chest pain    V58.69 Use of high risk medications            F11.90    Opioid use, unspecified, uncomplicated              Orders:          26368   Drug test prsmv qual dir  optical obs per day  (In-House)           787.91 Diarrhea            R19.7    Diarrhea, unspecified    789.06 Epigastric abdominal pain            R10.13    Epigastric pain

## 2021-05-18 NOTE — PROGRESS NOTES
Kevyn, Duane AJamila 1990     Office/Outpatient Visit    Visit Date: Thu, Aug 1, 2019 02:40 pm    Provider: Kari Zavaleta MD (Assistant: Haley Sherman MA)    Location: [facility]        Electronically signed by Kari Zavaleta MD on  08/01/2019 03:31:24 PM                             SUBJECTIVE:        CC:     Robel is a 29 year old White male.  This is a follow-up visit.  went to hospital last week, had blood clot in lung         HPI: Robel is here today to follow up PE.  Briefly, he was diagnosed 7/23/19 with PE and found to be positive for MTHFR.  He started Xarelto and is doing fine with that.  Has been on Percocet for pain from the infarct.  He has been off work for the past week.  He started a probiotic and omeprazole for some GI upset that developed after receiving Toradol in the hospital.  Referral was placed to Hematology due to family history of multiple different primary coagulopathies in first degree relatives.         Pain has eased up a lot.  He is taking 1-2 of the Percocets per day.  He is using them primarily at night to help get to sleep when the pain is a 5-6/10.  Still having a lot of SOB with pretty much any exertion.  Epigastric pain and diarrhea have all resolved with the ranitidine and probiotic.          ROS:     CONSTITUTIONAL:  Positive for fatigue and fever ( subjective (Tmax 99.1) ).      EYES:  Negative for blurred vision.      E/N/T:  Negative for diminished hearing and nasal congestion.      CARDIOVASCULAR:  Positive for chest pain.   Negative for palpitations.      RESPIRATORY:  Negative for recent cough and dyspnea.      GASTROINTESTINAL:  Positive for abdominal pain and diarrhea.   Negative for acid reflux symptoms, constipation, nausea or vomiting.      MUSCULOSKELETAL:  Negative for arthralgias and myalgias.      NEUROLOGICAL:  Negative for paresthesias and weakness.      PSYCHIATRIC:  Positive for anxiety and feelings of stress.   Negative for depression or  sleep disturbance.          PMH/FMH/SH:     Last Reviewed on 8/01/2019 03:15 PM by Kari Zavaleta    Past Medical History:             PAST MEDICAL HISTORY         Chicken pox     Hospitalizations: Never         Surgical History:     NONE         Family History:     Father: Coronary Artery Disease; Hypertension; Hyperlipidemia; Myocardial Infarction; OTHER (enter); Factor 5;  blood clots, pulmonary embolism     Mother: pulmonary embolism;  Depression;  Factor 5     Paternal Grandfather: Heart disease     Maternal Grandfather: Cancer (unspecified type)     Maternal Grandmother: Cancer (unspecified type);  Type 2 Diabetes         Social History:     Occupation: [employer], maintenance work;     Marital Status: Single     Children: None         Tobacco/Alcohol/Supplements:     Last Reviewed on 8/01/2019 03:15 PM by Kari Zavaleta    Tobacco: He has never smoked.          Alcohol: Patient has a past history of alcoholism.  His last drink was 84 days.      Caffeine:  He admits to consuming caffeine via tea ( 1 serving per day ).          Substance Abuse History:     Last Reviewed on 8/01/2019 03:15 PM by Kari Zavaleta    NEGATIVE         Mental Health History:     Last Reviewed on 8/01/2019 03:15 PM by Kari Zavaleta        Communicable Diseases (eg STDs):     Last Reviewed on 8/01/2019 03:15 PM by Kari Zavaleta            Current Problems:     Last Reviewed on 7/26/2019 11:34 AM by Kari Zavaleta    Use of high risk medications     Primary hypercoagulable state     Tachycardia, NOS     Anxiety with depression     Mid back pain     Dizziness     Epigastric abdominal pain     Diarrhea     Chest pain, other type     Pulmonary embolism and infarction, other     Screening for depression         Immunizations:     DTaP 1992     DTaP 1992     DTaP 5/19/1993     DTaP 1992     DTaP 4/11/1997     Td adult 5/12/2003     Td adult 11/30/2015     PedvaxHIB (Hib PRP-OMP) 1992     PedvaxHIB (Hib  PRP-OMP) 1992     PedvaxHIB (Hib PRP-OMP) 1992     PedvaxHIB (Hib PRP-OMP) 5/19/1993     Hep B (pedi/adol, 3-dose schedule) 6/19/2001     Hep B (pedi/adol, 3-dose schedule) 7/19/2001     Hep B (pedi/adol, 3-dose schedule) 2/6/2002     OPV  Poliovirus, live (oral) 1992     OPV  Poliovirus, live (oral) 1992     OPV  Poliovirus, live (oral) 5/19/1993     OPV  Poliovirus, live (oral) 4/11/1997     MMR  (Measles-Mumps-Rubella), live 5/19/1993     MMR  (Measles-Mumps-Rubella), live 4/11/1997         Allergies:     Last Reviewed on 7/26/2019 11:34 AM by Kari Zavaleta      No Known Drug Allergies.         Current Medications:     Last Reviewed on 7/26/2019 11:34 AM by Kari Zavaleta    Omeprazole 40mg Capsules, Extended Release 1 capsule daily     Percocet  10mg/325mg Tablet 1 every 8 hours prn     Xarelto 15mg Tablet Take 1 tablet(s) by mouth bid         OBJECTIVE:        Vitals:         Current: 8/1/2019 2:47:33 PM    Ht:  5 ft, 10.5 in;  Wt: 303.4 lbs;  BMI: 42.9    T: 98.8 F (oral);  BP: 152/90 mm Hg (left arm, sitting);  P: 120 bpm (left arm (BP Cuff), sitting);  sCr: 0.89 mg/dL;  GFR: 150.31        Repeat:     3:26:36 PM     P:   100bpm (left radial, sitting)         Exams:     PHYSICAL EXAM:     GENERAL: Vitals recorded well developed, well nourished;  well groomed;  no apparent distress;     EYES: extraocular movements intact; conjunctiva and cornea are normal; PERRLA;     E/N/T: OROPHARYNX:  normal mucosa, dentition, gingiva, and posterior pharynx;     RESPIRATORY: normal respiratory rate and pattern with no distress; normal breath sounds with no rales, rhonchi, wheezes or rubs;     CARDIOVASCULAR: normal rate; rhythm is regular;  no systolic murmur; no edema;     GASTROINTESTINAL: nontender; normal bowel sounds;     MUSCULOSKELETAL: normal gait; normal overall tone     PSYCHIATRIC:  appropriate affect and demeanor; normal speech pattern; grossly normal memory;         ASSESSMENT            415.19   I26.99  Pulmonary embolism and infarction, other              DDx:     786.59   R07.89  Chest pain, other type              DDx:     789.06   R10.13  Epigastric abdominal pain              DDx:         ORDERS:         Meds Prescribed:       Tramadol 50mg Tablet 1-2 po BID PRN  #30 (Thirty) tablet(s) Refills: 0         Lab Orders:       APPTO  Appointment need  (In-House)                   PLAN:          Pulmonary embolism and infarction, other Pain  is improved significantly but the dyspnea remains the main problem at this point.  He is not yet ready to go back to his job, which is very physical.  I am going to keep him off for another 3 weeks and then see him back here to reassess.  OK to complete continuous FMLA from 7/23/19 to 8/23/19.          FOLLOW-UP: Schedule a follow-up appointment in 3 weeks..  f/u pulmonary embolus with [facility]           Orders:       APPTO  Appointment need  (In-House)            Chest pain, other type Chest pain is much improved.  He is using the Percocet only 1-2 tabs per day now, mostly at night when he notices the pain the most.  He can finish out the Percocet that he has, then switch over to tramadol.  Pain should continue to improve as he gets further out from the infarct.  Prior tox screen appropriate.  Continue to monitor.           Prescriptions:       Tramadol 50mg Tablet 1-2 po BID PRN  #30 (Thirty) tablet(s) Refills: 0          Epigastric abdominal pain Resolved.  OK to stop omeprazole and probiotic.             Patient Recommendations:        For  Pulmonary embolism and infarction, other:     Schedule a follow-up visit in 3 weeks.                APPOINTMENT INFORMATION:        Monday Tuesday Wednesday Thursday Friday Saturday Sunday            Time:___________________AM  PM   Date:_____________________             CHARGE CAPTURE           **Please note: ICD descriptions below are intended for billing purposes only and may not represent clinical diagnoses**         Primary Diagnosis:         415.19 Pulmonary embolism and infarction, other            I26.99    Other pulmonary embolism without acute cor pulmonale              Orders:          92159   Office/outpatient visit; established patient, level 4  (In-House)             APPTO   Appointment need  (In-House)           786.59 Chest pain, other type            R07.89    Other chest pain    789.06 Epigastric abdominal pain            R10.13    Epigastric pain

## 2021-07-01 VITALS
HEIGHT: 71 IN | TEMPERATURE: 98.9 F | DIASTOLIC BLOOD PRESSURE: 74 MMHG | HEART RATE: 125 BPM | SYSTOLIC BLOOD PRESSURE: 119 MMHG | BODY MASS INDEX: 41.8 KG/M2 | WEIGHT: 298.6 LBS

## 2021-07-01 VITALS
SYSTOLIC BLOOD PRESSURE: 142 MMHG | DIASTOLIC BLOOD PRESSURE: 95 MMHG | TEMPERATURE: 98 F | HEIGHT: 71 IN | HEART RATE: 112 BPM | WEIGHT: 296.8 LBS | BODY MASS INDEX: 41.55 KG/M2

## 2021-07-01 VITALS
DIASTOLIC BLOOD PRESSURE: 75 MMHG | HEART RATE: 104 BPM | HEIGHT: 71 IN | SYSTOLIC BLOOD PRESSURE: 138 MMHG | TEMPERATURE: 99.4 F | WEIGHT: 300.4 LBS | BODY MASS INDEX: 42.06 KG/M2

## 2021-07-01 VITALS
SYSTOLIC BLOOD PRESSURE: 152 MMHG | BODY MASS INDEX: 42.48 KG/M2 | DIASTOLIC BLOOD PRESSURE: 90 MMHG | HEART RATE: 100 BPM | HEIGHT: 71 IN | WEIGHT: 303.4 LBS | TEMPERATURE: 98.8 F

## 2021-07-02 VITALS
HEART RATE: 97 BPM | TEMPERATURE: 98.7 F | DIASTOLIC BLOOD PRESSURE: 88 MMHG | BODY MASS INDEX: 42.64 KG/M2 | WEIGHT: 304.6 LBS | HEIGHT: 71 IN | SYSTOLIC BLOOD PRESSURE: 138 MMHG

## 2021-07-02 VITALS — HEIGHT: 71 IN | BODY MASS INDEX: 43.09 KG/M2 | TEMPERATURE: 98.4 F

## 2022-05-26 ENCOUNTER — OFFICE VISIT (OUTPATIENT)
Dept: FAMILY MEDICINE CLINIC | Age: 32
End: 2022-05-26

## 2022-05-26 VITALS
DIASTOLIC BLOOD PRESSURE: 79 MMHG | HEIGHT: 71 IN | BODY MASS INDEX: 42.56 KG/M2 | SYSTOLIC BLOOD PRESSURE: 137 MMHG | HEART RATE: 108 BPM | WEIGHT: 304 LBS

## 2022-05-26 DIAGNOSIS — D68.59 OTHER PRIMARY THROMBOPHILIA: ICD-10-CM

## 2022-05-26 DIAGNOSIS — F32.5 DEPRESSION, MAJOR, IN REMISSION: ICD-10-CM

## 2022-05-26 DIAGNOSIS — F41.9 ANXIETY: Primary | ICD-10-CM

## 2022-05-26 PROBLEM — F33.0 MAJOR DEPRESSIVE DISORDER, RECURRENT EPISODE, MILD DEGREE: Status: ACTIVE | Noted: 2022-05-26

## 2022-05-26 PROCEDURE — 99214 OFFICE O/P EST MOD 30 MIN: CPT | Performed by: FAMILY MEDICINE

## 2022-05-26 RX ORDER — ESCITALOPRAM OXALATE 5 MG/1
5 TABLET ORAL DAILY
Qty: 30 TABLET | Refills: 5 | Status: SHIPPED | OUTPATIENT
Start: 2022-05-26 | End: 2022-06-23 | Stop reason: SDUPTHER

## 2022-05-26 RX ORDER — BUSPIRONE HYDROCHLORIDE 5 MG/1
5 TABLET ORAL 2 TIMES DAILY PRN
Qty: 60 TABLET | Refills: 5 | Status: SHIPPED | OUTPATIENT
Start: 2022-05-26 | End: 2022-06-23 | Stop reason: SDUPTHER

## 2022-05-26 NOTE — ASSESSMENT & PLAN NOTE
Anxiety has been worse lately.  He has a lot of external stressors.  We are going to start him back on his Lexapro 5 mg daily and I will also add in buspirone to be taken 5 mg up to twice daily as needed.  I would suggest that he take it single dose at least once in the morning while we are waiting for the Lexapro to build up its effect.  I will see him back in 4 weeks for close follow-up.

## 2022-05-26 NOTE — PROGRESS NOTES
"Chief Complaint  Anxiety    Subjective          Duane A Centers Jr. presents to [facility] today for routine f/u of chronic issues.    \"My anxiety has been off the charts.\"  He has been doing well with his mood and depression.  However, for the past 2 weeks, his anxiety has been worsening daily.  He has been having panic attacks.  He has a lot on his plate between work and his parents' health.    He has been on Lexapro for depression and insomnia in the past.  He has tried Prozac but without much effect.      Current Outpatient Medications:   •  busPIRone (BUSPAR) 5 MG tablet, Take 1 tablet by mouth 2 (Two) Times a Day As Needed (anxiety)., Disp: 60 tablet, Rfl: 5  •  escitalopram (LEXAPRO) 5 MG tablet, Take 1 tablet by mouth Daily., Disp: 30 tablet, Rfl: 5    Allergies:  Patient has no known allergies.      Objective   Vital Signs:   Vitals:    05/26/22 1521   BP: 137/79   BP Location: Right arm   Patient Position: Sitting   Pulse: 108   Weight: (!) 138 kg (304 lb)   Height: 179.1 cm (70.5\")       Physical Exam  Vitals reviewed.   Constitutional:       General: He is not in acute distress.     Appearance: Normal appearance. He is well-developed.   HENT:      Head: Normocephalic and atraumatic.      Right Ear: External ear normal.      Left Ear: External ear normal.   Eyes:      Extraocular Movements: Extraocular movements intact.      Conjunctiva/sclera: Conjunctivae normal.      Pupils: Pupils are equal, round, and reactive to light.   Cardiovascular:      Rate and Rhythm: Normal rate and regular rhythm.      Heart sounds: No murmur heard.  Pulmonary:      Effort: Pulmonary effort is normal.      Breath sounds: Normal breath sounds. No wheezing, rhonchi or rales.   Abdominal:      General: Bowel sounds are normal. There is no distension.      Palpations: Abdomen is soft.      Tenderness: There is no abdominal tenderness.   Musculoskeletal:         General: Normal range of motion. "   Neurological:      Mental Status: He is alert.   Psychiatric:         Mood and Affect: Affect normal.             Assessment and Plan    Diagnoses and all orders for this visit:    1. Anxiety (Primary)  Assessment & Plan:  Anxiety has been worse lately.  He has a lot of external stressors.  We are going to start him back on his Lexapro 5 mg daily and I will also add in buspirone to be taken 5 mg up to twice daily as needed.  I would suggest that he take it single dose at least once in the morning while we are waiting for the Lexapro to build up its effect.  I will see him back in 4 weeks for close follow-up.    Orders:  -     escitalopram (LEXAPRO) 5 MG tablet; Take 1 tablet by mouth Daily.  Dispense: 30 tablet; Refill: 5  -     busPIRone (BUSPAR) 5 MG tablet; Take 1 tablet by mouth 2 (Two) Times a Day As Needed (anxiety).  Dispense: 60 tablet; Refill: 5    2. Depression, major, in remission (HCC)  Assessment & Plan:  Not currently giving him problems.      3. Other primary thrombophilia (HCC)  Overview:  Cleared by Hematology.        Follow Up   Return in about 4 weeks (around 6/23/2022) for Recheck.  Patient was given instructions and counseling regarding his condition or for health maintenance advice. Please see specific information pulled into the AVS if appropriate.     Duane A Centers Jr.  reports that he has never smoked. His smokeless tobacco use includes chew.. I have educated him on the risk of diseases from using tobacco products such as cancer, COPD and heart disease.     I advised him to quit and he is not willing to quit.    I spent 1 minutes counseling the patient.

## 2022-06-23 ENCOUNTER — OFFICE VISIT (OUTPATIENT)
Dept: FAMILY MEDICINE CLINIC | Age: 32
End: 2022-06-23

## 2022-06-23 VITALS
TEMPERATURE: 99.1 F | SYSTOLIC BLOOD PRESSURE: 141 MMHG | HEART RATE: 119 BPM | DIASTOLIC BLOOD PRESSURE: 93 MMHG | BODY MASS INDEX: 43.48 KG/M2 | HEIGHT: 71 IN | WEIGHT: 310.6 LBS

## 2022-06-23 DIAGNOSIS — F41.9 ANXIETY: Primary | ICD-10-CM

## 2022-06-23 PROCEDURE — 99214 OFFICE O/P EST MOD 30 MIN: CPT | Performed by: FAMILY MEDICINE

## 2022-06-23 RX ORDER — ESCITALOPRAM OXALATE 10 MG/1
10 TABLET ORAL DAILY
Qty: 30 TABLET | Refills: 1 | Status: SHIPPED | OUTPATIENT
Start: 2022-06-23 | End: 2022-07-29 | Stop reason: SDUPTHER

## 2022-06-23 RX ORDER — BUSPIRONE HYDROCHLORIDE 10 MG/1
10 TABLET ORAL 2 TIMES DAILY PRN
Qty: 60 TABLET | Refills: 1 | Status: SHIPPED | OUTPATIENT
Start: 2022-06-23 | End: 2022-07-29 | Stop reason: ALTCHOICE

## 2022-06-23 NOTE — ASSESSMENT & PLAN NOTE
Not really seen very much improvement at all in his symptoms since starting the Lexapro and buspirone, but likewise he has not had much in the way of side effects.  He does note a little bit of insomnia but is not clear if this is coming from anxiety or from the buspirone.  He is taking that medication around 5-6 I have encouraged him to shift that a couple of hours earlier.  We are going to be increasing both the escitalopram to 10 mg from 5 mg daily today and the buspirone from 5 mg twice daily to 10 mg twice daily.  I have sent in the prescription.  I will see him back in 4 to 5 weeks for an physical or sooner as needed.

## 2022-06-23 NOTE — PROGRESS NOTES
"Chief Complaint  Anxiety (4 week f/u, states minimal improvement )    Subjective          Duane A Centers Jr. presents to [facility] today for follow-up of anxiety.    He was seen 4 weeks ago for worsening anxiety at which time he was started on escitalopram 5 mg daily with buspirone 5 mg to be used twice daily.  He has not noticed much improvement if any with starting the meds.   He has noticed a little bit of insomnia but no other problems.      He has been on fluoxetine in the past (ineffective).      Current Outpatient Medications:   •  busPIRone (BUSPAR) 10 MG tablet, Take 1 tablet by mouth 2 (Two) Times a Day As Needed (anxiety)., Disp: 60 tablet, Rfl: 1  •  escitalopram (LEXAPRO) 10 MG tablet, Take 1 tablet by mouth Daily., Disp: 30 tablet, Rfl: 1    Allergies:  Patient has no known allergies.      Objective   Vital Signs:   Vitals:    06/23/22 1107   BP: 141/93   BP Location: Left arm   Patient Position: Sitting   Pulse: 119   Temp: 99.1 °F (37.3 °C)   TempSrc: Oral   Weight: (!) 141 kg (310 lb 9.6 oz)   Height: 179.1 cm (70.5\")       Physical Exam  Constitutional:       Appearance: Normal appearance.   HENT:      Head: Normocephalic and atraumatic.   Eyes:      Extraocular Movements: Extraocular movements intact.      Conjunctiva/sclera: Conjunctivae normal.   Pulmonary:      Effort: Pulmonary effort is normal. No respiratory distress.   Musculoskeletal:         General: Normal range of motion.   Skin:     General: Skin is warm and dry.   Neurological:      General: No focal deficit present.      Mental Status: He is alert and oriented to person, place, and time.   Psychiatric:         Mood and Affect: Mood normal.         Behavior: Behavior normal.         Thought Content: Thought content normal.         Judgment: Judgment normal.             Assessment and Plan    Diagnoses and all orders for this visit:    1. Anxiety (Primary)  Assessment & Plan:  Not really seen very " much improvement at all in his symptoms since starting the Lexapro and buspirone, but likewise he has not had much in the way of side effects.  He does note a little bit of insomnia but is not clear if this is coming from anxiety or from the buspirone.  He is taking that medication around 5-6 I have encouraged him to shift that a couple of hours earlier.  We are going to be increasing both the escitalopram to 10 mg from 5 mg daily today and the buspirone from 5 mg twice daily to 10 mg twice daily.  I have sent in the prescription.  I will see him back in 4 to 5 weeks for an physical or sooner as needed.    Orders:  -     escitalopram (LEXAPRO) 10 MG tablet; Take 1 tablet by mouth Daily.  Dispense: 30 tablet; Refill: 1  -     busPIRone (BUSPAR) 10 MG tablet; Take 1 tablet by mouth 2 (Two) Times a Day As Needed (anxiety).  Dispense: 60 tablet; Refill: 1      Follow Up   Return in about 4 weeks (around 7/21/2022) for Annual physical (4-5 weeks out okay).  Patient was given instructions and counseling regarding his condition or for health maintenance advice. Please see specific information pulled into the AVS if appropriate.

## 2022-07-29 ENCOUNTER — OFFICE VISIT (OUTPATIENT)
Dept: FAMILY MEDICINE CLINIC | Age: 32
End: 2022-07-29

## 2022-07-29 VITALS
SYSTOLIC BLOOD PRESSURE: 132 MMHG | HEIGHT: 71 IN | HEART RATE: 91 BPM | WEIGHT: 315 LBS | TEMPERATURE: 98.7 F | DIASTOLIC BLOOD PRESSURE: 75 MMHG | BODY MASS INDEX: 44.1 KG/M2

## 2022-07-29 DIAGNOSIS — F32.5 DEPRESSION, MAJOR, IN REMISSION: ICD-10-CM

## 2022-07-29 DIAGNOSIS — Z00.00 ANNUAL PHYSICAL EXAM: Primary | ICD-10-CM

## 2022-07-29 DIAGNOSIS — Z11.59 ENCOUNTER FOR SCREENING FOR OTHER VIRAL DISEASES: ICD-10-CM

## 2022-07-29 DIAGNOSIS — F41.9 ANXIETY: ICD-10-CM

## 2022-07-29 PROCEDURE — 99214 OFFICE O/P EST MOD 30 MIN: CPT | Performed by: FAMILY MEDICINE

## 2022-07-29 PROCEDURE — 99395 PREV VISIT EST AGE 18-39: CPT | Performed by: FAMILY MEDICINE

## 2022-07-29 RX ORDER — BUSPIRONE HYDROCHLORIDE 5 MG/1
10 TABLET ORAL 2 TIMES DAILY PRN
Qty: 60 TABLET | Refills: 2 | Status: CANCELLED
Start: 2022-07-29

## 2022-07-29 RX ORDER — ESCITALOPRAM OXALATE 20 MG/1
20 TABLET ORAL DAILY
Qty: 30 TABLET | Refills: 5 | Status: SHIPPED | OUTPATIENT
Start: 2022-07-29 | End: 2022-08-30 | Stop reason: SDUPTHER

## 2022-07-29 RX ORDER — PROPRANOLOL HYDROCHLORIDE 10 MG/1
10 TABLET ORAL 2 TIMES DAILY PRN
Qty: 60 TABLET | Refills: 5 | Status: SHIPPED | OUTPATIENT
Start: 2022-07-29 | End: 2022-08-30 | Stop reason: SDUPTHER

## 2022-07-29 RX ORDER — CYCLOBENZAPRINE HCL 10 MG
10 TABLET ORAL 3 TIMES DAILY PRN
COMMUNITY
Start: 2022-07-20 | End: 2022-08-30

## 2022-07-29 NOTE — ASSESSMENT & PLAN NOTE
He still has not really noticed much difference yet in his symptoms.  Regardless stop the buspirone altogether since he is starting to have insomnia with taking it and we can no longer push the dose.  Instead, I am going to replace that with propranolol 10 mg twice daily as needed.  Additionally, we are going to increase his Lexapro to 20 mg daily from 10 mg daily.  I have sent both of those prescriptions.  I will see him back in 4 weeks or sooner as needed.

## 2022-07-29 NOTE — PROGRESS NOTES
Chief Complaint  Annual Exam    Subjective          Duane A Centers Jr. presents to [facility] today for his annual physical.      He is reports he has had his first two COVID vaccines but does not have the dates.  Flu shot not currently indicated.  He is up-to-date on Td (11/2015).    At his last visit 4 weeks ago, we increased the Lexapro to 10 mg daily and buspirone to 10 mg twice daily.  He has been on fluoxetine in the past (ineffective).  He has noticed more trouble with sleeping.  Still having the feeling of breakthrough anxiety at times.    Review of Systems   Constitutional: Negative for chills, fatigue and fever.   HENT: Negative for congestion, hearing loss and rhinorrhea.    Eyes: Negative for pain and visual disturbance.   Respiratory: Negative for cough and shortness of breath.    Cardiovascular: Negative for chest pain and palpitations.   Gastrointestinal: Negative for abdominal pain, constipation, diarrhea, nausea and vomiting.   Genitourinary: Negative for dysuria and hematuria.   Musculoskeletal: Negative for arthralgias and myalgias.   Skin: Negative for rash.   Neurological: Negative for weakness and numbness.   Psychiatric/Behavioral: Negative for dysphoric mood and sleep disturbance. The patient is nervous/anxious.          Current Outpatient Medications:   •  cyclobenzaprine (FLEXERIL) 10 MG tablet, Take 10 mg by mouth 3 (Three) Times a Day As Needed. for muscle spams, Disp: , Rfl:   •  escitalopram (LEXAPRO) 20 MG tablet, Take 1 tablet by mouth Daily., Disp: 30 tablet, Rfl: 5  •  propranolol (INDERAL) 10 MG tablet, Take 1 tablet by mouth 2 (Two) Times a Day As Needed (anxiety)., Disp: 60 tablet, Rfl: 5    Allergies:  Patient has no known allergies.      Objective   Vital Signs:   Vitals:    07/29/22 1420   BP: 132/75   BP Location: Left arm   Patient Position: Sitting   Pulse: 91   Temp: 98.7 °F (37.1 °C)   TempSrc: Oral   Weight: (!) 144 kg (316 lb 9.6 oz)  "  Height: 179.1 cm (70.51\")     Physical Exam  Vitals reviewed.   Constitutional:       General: He is not in acute distress.     Appearance: Normal appearance. He is well-developed.   HENT:      Head: Normocephalic and atraumatic.      Right Ear: External ear normal.      Left Ear: External ear normal.      Mouth/Throat:      Mouth: Mucous membranes are moist.   Eyes:      Extraocular Movements: Extraocular movements intact.      Conjunctiva/sclera: Conjunctivae normal.      Pupils: Pupils are equal, round, and reactive to light.   Cardiovascular:      Rate and Rhythm: Normal rate and regular rhythm.      Heart sounds: No murmur heard.  Pulmonary:      Effort: Pulmonary effort is normal.      Breath sounds: Normal breath sounds. No wheezing, rhonchi or rales.   Abdominal:      General: Bowel sounds are normal. There is no distension.      Palpations: Abdomen is soft.      Tenderness: There is no abdominal tenderness.   Musculoskeletal:         General: Normal range of motion.   Skin:     General: Skin is warm and dry.   Neurological:      Mental Status: He is alert and oriented to person, place, and time.      Deep Tendon Reflexes: Reflexes normal.   Psychiatric:         Mood and Affect: Mood and affect normal.         Behavior: Behavior normal.         Thought Content: Thought content normal.         Judgment: Judgment normal.             Assessment and Plan    Diagnoses and all orders for this visit:    1. Annual physical exam (Primary)  Assessment & Plan:  He is reports he has had his first two COVID vaccines but does not have the dates.  He will work on getting us his record.  Flu shot not currently indicated.  He is up-to-date on Td (11/2015).    Orders:  -     Comprehensive Metabolic Panel; Future  -     Lipid Panel; Future    2. Anxiety  Assessment & Plan:  He still has not really noticed much difference yet in his symptoms.  Regardless stop the buspirone altogether since he is starting to have insomnia with " taking it and we can no longer push the dose.  Instead, I am going to replace that with propranolol 10 mg twice daily as needed.  Additionally, we are going to increase his Lexapro to 20 mg daily from 10 mg daily.  I have sent both of those prescriptions.  I will see him back in 4 weeks or sooner as needed.    Orders:  -     escitalopram (LEXAPRO) 20 MG tablet; Take 1 tablet by mouth Daily.  Dispense: 30 tablet; Refill: 5  -     propranolol (INDERAL) 10 MG tablet; Take 1 tablet by mouth 2 (Two) Times a Day As Needed (anxiety).  Dispense: 60 tablet; Refill: 5    3. Depression, major, in remission (HCC)  Assessment & Plan:  As per anxiety plan.    Orders:  -     escitalopram (LEXAPRO) 20 MG tablet; Take 1 tablet by mouth Daily.  Dispense: 30 tablet; Refill: 5    4. Encounter for screening for other viral diseases  -     Hepatitis C Antibody; Future      Follow Up   Return in about 4 weeks (around 8/26/2022) for Recheck.  Patient was given instructions and counseling regarding his condition or for health maintenance advice. Please see specific information pulled into the AVS if appropriate.

## 2022-07-29 NOTE — ASSESSMENT & PLAN NOTE
He is reports he has had his first two COVID vaccines but does not have the dates.  He will work on getting us his record.  Flu shot not currently indicated.  He is up-to-date on Td (11/2015).

## 2022-08-09 ENCOUNTER — TELEPHONE (OUTPATIENT)
Dept: FAMILY MEDICINE CLINIC | Age: 32
End: 2022-08-09

## 2022-08-16 NOTE — TELEPHONE ENCOUNTER
8/16/22 @ 11:35 spoke with patient about overdue labs, pt supposed to come in within next 2 weeks to have done.

## 2022-08-19 ENCOUNTER — LAB (OUTPATIENT)
Dept: LAB | Facility: HOSPITAL | Age: 32
End: 2022-08-19

## 2022-08-19 DIAGNOSIS — Z00.00 ANNUAL PHYSICAL EXAM: ICD-10-CM

## 2022-08-19 DIAGNOSIS — Z11.59 ENCOUNTER FOR SCREENING FOR OTHER VIRAL DISEASES: ICD-10-CM

## 2022-08-19 LAB
ALBUMIN SERPL-MCNC: 4.6 G/DL (ref 3.5–5.2)
ALBUMIN/GLOB SERPL: 2.2 G/DL
ALP SERPL-CCNC: 57 U/L (ref 39–117)
ALT SERPL W P-5'-P-CCNC: 26 U/L (ref 1–41)
ANION GAP SERPL CALCULATED.3IONS-SCNC: 10.8 MMOL/L (ref 5–15)
AST SERPL-CCNC: 22 U/L (ref 1–40)
BILIRUB SERPL-MCNC: 0.5 MG/DL (ref 0–1.2)
BUN SERPL-MCNC: 9 MG/DL (ref 6–20)
BUN/CREAT SERPL: 9.6 (ref 7–25)
CALCIUM SPEC-SCNC: 9.7 MG/DL (ref 8.6–10.5)
CHLORIDE SERPL-SCNC: 108 MMOL/L (ref 98–107)
CHOLEST SERPL-MCNC: 190 MG/DL (ref 0–200)
CO2 SERPL-SCNC: 25.2 MMOL/L (ref 22–29)
CREAT SERPL-MCNC: 0.94 MG/DL (ref 0.76–1.27)
EGFRCR SERPLBLD CKD-EPI 2021: 111.8 ML/MIN/1.73
GLOBULIN UR ELPH-MCNC: 2.1 GM/DL
GLUCOSE SERPL-MCNC: 85 MG/DL (ref 65–99)
HCV AB SER DONR QL: NORMAL
HDLC SERPL-MCNC: 42 MG/DL (ref 40–60)
LDLC SERPL CALC-MCNC: 133 MG/DL (ref 0–100)
LDLC/HDLC SERPL: 3.13 {RATIO}
POTASSIUM SERPL-SCNC: 4.5 MMOL/L (ref 3.5–5.2)
PROT SERPL-MCNC: 6.7 G/DL (ref 6–8.5)
SODIUM SERPL-SCNC: 144 MMOL/L (ref 136–145)
TRIGL SERPL-MCNC: 83 MG/DL (ref 0–150)
VLDLC SERPL-MCNC: 15 MG/DL (ref 5–40)

## 2022-08-19 PROCEDURE — 86803 HEPATITIS C AB TEST: CPT

## 2022-08-19 PROCEDURE — 80061 LIPID PANEL: CPT

## 2022-08-19 PROCEDURE — 36415 COLL VENOUS BLD VENIPUNCTURE: CPT

## 2022-08-19 PROCEDURE — 80053 COMPREHEN METABOLIC PANEL: CPT

## 2022-08-30 ENCOUNTER — TELEMEDICINE (OUTPATIENT)
Dept: FAMILY MEDICINE CLINIC | Age: 32
End: 2022-08-30

## 2022-08-30 DIAGNOSIS — F32.5 DEPRESSION, MAJOR, IN REMISSION: ICD-10-CM

## 2022-08-30 DIAGNOSIS — F41.9 ANXIETY: Primary | ICD-10-CM

## 2022-08-30 PROBLEM — Z00.00 ANNUAL PHYSICAL EXAM: Status: RESOLVED | Noted: 2022-07-29 | Resolved: 2022-08-30

## 2022-08-30 PROCEDURE — 99213 OFFICE O/P EST LOW 20 MIN: CPT | Performed by: FAMILY MEDICINE

## 2022-08-30 RX ORDER — PROPRANOLOL HYDROCHLORIDE 10 MG/1
10 TABLET ORAL 2 TIMES DAILY PRN
Qty: 180 TABLET | Refills: 1 | Status: SHIPPED | OUTPATIENT
Start: 2022-08-30 | End: 2022-11-10 | Stop reason: SDUPTHER

## 2022-08-30 RX ORDER — ESCITALOPRAM OXALATE 20 MG/1
20 TABLET ORAL DAILY
Qty: 90 TABLET | Refills: 1 | Status: SHIPPED | OUTPATIENT
Start: 2022-08-30 | End: 2023-02-21 | Stop reason: SDUPTHER

## 2022-08-30 NOTE — PROGRESS NOTES
"Chief Complaint  Anxiety (*video visit [phone]* 1 month follow up)     Duane A Centers Jr. has consented to use a telehealth visit for his medical care today: Yes.  Additional staff present for the encounter was Maci Lay MA.     This telehealth visit was conducted today via video conference.  I am present in the office and conducting the visit via [employer] on my phone.  Duane is present at home and conducting his end of the visit using his smart phone.    Subjective          Duane A Centers Jr. presents to [facility] today for follow-up of chronic issues.    At his last visit 4 weeks ago, we increased the Lexapro to 20 mg daily from 20 mg.  Buspirone was discontinued after he reported insomnia and he was instead started on propranolol 10 mg BID.  He has been on fluoxetine in the past (ineffective).  \"I think we've got a combo that works.\"  Increasing the Lexapro seems to have done a really good job of bringing the baseline anxiety and depression levels down to a non-noticeable level.  He is using the propranolol typically once daily and that has been working well too.  No adverse effects reported.      Current Outpatient Medications:   •  escitalopram (LEXAPRO) 20 MG tablet, Take 1 tablet by mouth Daily., Disp: 90 tablet, Rfl: 1  •  propranolol (INDERAL) 10 MG tablet, Take 1 tablet by mouth 2 (Two) Times a Day As Needed (anxiety)., Disp: 180 tablet, Rfl: 1    Allergies:  Patient has no known allergies.      Objective   Vital Signs:   There were no vitals filed for this visit.    Physical Exam  Constitutional:       Appearance: Normal appearance.   HENT:      Head: Normocephalic and atraumatic.   Eyes:      Extraocular Movements: Extraocular movements intact.      Conjunctiva/sclera: Conjunctivae normal.   Pulmonary:      Effort: Pulmonary effort is normal. No respiratory distress.   Musculoskeletal:         General: Normal range of motion.   Skin:     General: Skin is warm " and dry.   Neurological:      General: No focal deficit present.      Mental Status: He is alert and oriented to person, place, and time.   Psychiatric:         Mood and Affect: Mood normal.         Behavior: Behavior normal.         Thought Content: Thought content normal.         Judgment: Judgment normal.          Lab Results   Component Value Date    GLUCOSE 85 08/19/2022    BUN 9 08/19/2022    CREATININE 0.94 08/19/2022    BCR 9.6 08/19/2022    K 4.5 08/19/2022    CO2 25.2 08/19/2022    CALCIUM 9.7 08/19/2022    ALBUMIN 4.60 08/19/2022    LABIL2 1.4 07/24/2019    AST 22 08/19/2022    ALT 26 08/19/2022       Lab Results   Component Value Date    CHOL 190 08/19/2022    TRIG 83 08/19/2022    HDL 42 08/19/2022     (H) 08/19/2022            Assessment and Plan    Diagnoses and all orders for this visit:    1. Anxiety (Primary)  Assessment & Plan:  Doing great now on current regimen of Lexapro 20 mg daily and propranol 10 mg BID prn (he is typically using it just once daily).  Will plan to continue current regimen now that he is stable and re-evaluate in 3 months.  Refills sent for a 90 DS on both meds.  Continue to monitor.    Orders:  -     escitalopram (LEXAPRO) 20 MG tablet; Take 1 tablet by mouth Daily.  Dispense: 90 tablet; Refill: 1  -     propranolol (INDERAL) 10 MG tablet; Take 1 tablet by mouth 2 (Two) Times a Day As Needed (anxiety).  Dispense: 180 tablet; Refill: 1    2. Depression, major, in remission ([facility])  Overview:  In remission.    Orders:  -     escitalopram (LEXAPRO) 20 MG tablet; Take 1 tablet by mouth Daily.  Dispense: 90 tablet; Refill: 1      Follow Up   Return in about 3 months (around 11/30/2022) for Recheck.  Patient was given instructions and counseling regarding his condition or for health maintenance advice. Please see specific information pulled into the AVS if appropriate.

## 2022-08-30 NOTE — ASSESSMENT & PLAN NOTE
Doing great now on current regimen of Lexapro 20 mg daily and propranol 10 mg BID prn (he is typically using it just once daily).  Will plan to continue current regimen now that he is stable and re-evaluate in 3 months.  Refills sent for a 90 DS on both meds.  Continue to monitor.

## 2022-09-12 ENCOUNTER — TELEMEDICINE (OUTPATIENT)
Dept: FAMILY MEDICINE CLINIC | Age: 32
End: 2022-09-12

## 2022-09-12 VITALS — WEIGHT: 315 LBS | HEIGHT: 71 IN | BODY MASS INDEX: 44.1 KG/M2

## 2022-09-12 DIAGNOSIS — R11.2 NAUSEA AND VOMITING, UNSPECIFIED VOMITING TYPE: Primary | ICD-10-CM

## 2022-09-12 DIAGNOSIS — R19.7 DIARRHEA, UNSPECIFIED TYPE: ICD-10-CM

## 2022-09-12 PROCEDURE — 99213 OFFICE O/P EST LOW 20 MIN: CPT

## 2022-09-12 NOTE — PROGRESS NOTES
"Chief Complaint  Nausea (*video visit [phone]* Nausea, vomiting )    Subjective        Duane A Centers Jr. presents to [facility]  History of Present Illness    Duane A Centers Jr. has consented to use a telehealth visit for his medical care today: Yes.  Additional staff present for the encounter was Maci Lay MA.  This telehealth visit was conducted today via video conference.  I am present in the office and conducting the visit via [employer] on my phone.  Duane is present at home and conducting his end of the visit using his smart phone. ID confirmed using name and date of birth     Patient is a 32-year-old male who presents today via telehealth with complaints of nausea, vomiting and diarrhea.  He feels like he has a stomach virus.  His symptoms began around 3:30 to 4:00 this morning.  He is able to keep water down.  He has not ate much today.  He denies any fever but does endorse some chills.  He denies any abdominal pain, blood in stool or vomit. He does have some abdominal cramping.     Review of Systems   Constitutional: Positive for chills. Negative for fever.   Gastrointestinal: Positive for diarrhea, nausea and vomiting. Negative for abdominal pain and blood in stool.   Genitourinary: Negative for dysuria and flank pain.   Musculoskeletal: Negative for myalgias.   Neurological: Negative for light-headedness.       Objective   Vital Signs:  Ht 179.1 cm (70.51\")   Wt (!) 143 kg (316 lb)   BMI 44.69 kg/m²   Estimated body mass index is 44.69 kg/m² as calculated from the following:    Height as of this encounter: 179.1 cm (70.51\").    Weight as of this encounter: 143 kg (316 lb).    Physical Exam  Constitutional:       General: He is not in acute distress.     Appearance: Normal appearance. He is not ill-appearing.   Pulmonary:      Effort: No respiratory distress.   Neurological:      Mental Status: He is alert and oriented to person, place, and time.   Psychiatric:  "        Mood and Affect: Mood normal.         Behavior: Behavior normal.               Assessment and Plan   Diagnoses and all orders for this visit:    1. Nausea and vomiting, unspecified vomiting type (Primary)    2. Diarrhea, unspecified type      Encouraged increased fluid intake, electrolyte replacement with Pedialyte or sugar-free Gatorade.  Do not recommend antiemetics at this time.  Encouraged small bland meals, eat foods as tolerated. Given limited nature of telehealth, advised patient if symptoms worsen at all he is to proceed to ER or follow-up in clinic. If unable to keep food or fluids down he needs to proceed to ER. Patient voiced understanding of all instructions and had no further questions at this time.        Follow Up   Return if symptoms worsen or fail to improve, for Next scheduled follow up.  Patient was given instructions and counseling regarding his condition or for health maintenance advice. Please see specific information pulled into the AVS if appropriate.

## 2022-10-31 ENCOUNTER — OFFICE VISIT (OUTPATIENT)
Dept: FAMILY MEDICINE CLINIC | Age: 32
End: 2022-10-31

## 2022-10-31 VITALS
TEMPERATURE: 99.9 F | OXYGEN SATURATION: 98 % | BODY MASS INDEX: 42.31 KG/M2 | WEIGHT: 302.2 LBS | DIASTOLIC BLOOD PRESSURE: 92 MMHG | HEART RATE: 118 BPM | HEIGHT: 71 IN | SYSTOLIC BLOOD PRESSURE: 143 MMHG

## 2022-10-31 DIAGNOSIS — J06.9 UPPER RESPIRATORY TRACT INFECTION, UNSPECIFIED TYPE: Primary | ICD-10-CM

## 2022-10-31 DIAGNOSIS — R00.0 TACHYCARDIA: ICD-10-CM

## 2022-10-31 LAB
EXPIRATION DATE: NORMAL
FLUAV AG UPPER RESP QL IA.RAPID: NOT DETECTED
FLUBV AG UPPER RESP QL IA.RAPID: NOT DETECTED
INTERNAL CONTROL: NORMAL
Lab: NORMAL
SARS-COV-2 AG UPPER RESP QL IA.RAPID: NOT DETECTED

## 2022-10-31 PROCEDURE — U0004 COV-19 TEST NON-CDC HGH THRU: HCPCS | Performed by: NURSE PRACTITIONER

## 2022-10-31 PROCEDURE — 87428 SARSCOV & INF VIR A&B AG IA: CPT | Performed by: NURSE PRACTITIONER

## 2022-10-31 PROCEDURE — 99213 OFFICE O/P EST LOW 20 MIN: CPT | Performed by: NURSE PRACTITIONER

## 2022-10-31 RX ORDER — BENZONATATE 100 MG/1
100 CAPSULE ORAL 3 TIMES DAILY PRN
Qty: 40 CAPSULE | Refills: 0 | Status: SHIPPED | OUTPATIENT
Start: 2022-10-31 | End: 2022-11-10

## 2022-10-31 NOTE — PROGRESS NOTES
"Chief Complaint  Duane A Centers Jr. presents to [facility] for Fever (Cough,chills, fever. /X1 day. )    Subjective          History of Present Illness    Robel is here today with c/o symptoms that started yesterday including cough, chills, fever up to 102. Has been taking Dayquil for symptoms. No known ill contacts. He does work in maintenance at [employer].     Review of Systems      No Known Allergies   History reviewed. No pertinent past medical history.  Current Outpatient Medications   Medication Sig Dispense Refill   • escitalopram (LEXAPRO) 20 MG tablet Take 1 tablet by mouth Daily. 90 tablet 1   • propranolol (INDERAL) 10 MG tablet Take 1 tablet by mouth 2 (Two) Times a Day As Needed (anxiety). 180 tablet 1   • benzonatate (Tessalon Perles) 100 MG capsule Take 1 capsule by mouth 3 (Three) Times a Day As Needed for Cough. 40 capsule 0     No current facility-administered medications for this visit.     History reviewed. No pertinent surgical history.   Social History     Tobacco Use   • Smoking status: Never   • Smokeless tobacco: Current     Types: Chew     History reviewed. No pertinent family history.  Health Maintenance Due   Topic Date Due   • COVID-19 Vaccine (3 - Booster for Moderna series) 04/30/2021      Immunization History   Administered Date(s) Administered   • COVID-19 (MODERNA) 1st, 2nd, 3rd Dose Only 02/05/2021, 03/05/2021   • Td 11/30/2015        Objective     Vitals:    10/31/22 1535 10/31/22 1537 10/31/22 1623   BP: (!) 164/114 143/92    BP Location: Left arm Right arm    Patient Position: Sitting Sitting    Cuff Size: Large Adult Large Adult    Pulse: (!) 130 (!) 121 118   Temp: 99.9 °F (37.7 °C)     TempSrc: Oral     SpO2: 98%  98%   Weight: (!) 137 kg (302 lb 3.2 oz)     Height: 179.1 cm (70.51\")       Body mass index is 42.73 kg/m².     Physical Exam  Vitals reviewed.   Constitutional:       General: He is not in acute distress.     " Appearance: Normal appearance. He is well-developed.   HENT:      Head: Normocephalic and atraumatic.      Right Ear: Tympanic membrane and ear canal normal.      Left Ear: Tympanic membrane and ear canal normal.      Nose: Nose normal.      Mouth/Throat:      Mouth: Mucous membranes are moist.   Eyes:      Extraocular Movements: Extraocular movements intact.      Pupils: Pupils are equal, round, and reactive to light.   Cardiovascular:      Rate and Rhythm: Normal rate and regular rhythm.   Pulmonary:      Effort: Pulmonary effort is normal.      Breath sounds: Normal breath sounds.   Neurological:      Mental Status: He is alert and oriented to person, place, and time.   Psychiatric:         Mood and Affect: Mood and affect normal.            Result Review :                               Assessment and Plan      Diagnoses and all orders for this visit:    1. Upper respiratory tract infection, unspecified type (Primary)  Comments:  Rest, fluids. Tylenol and Ibuprofen per package instructions. Rapid covid and flu test negative. Await PCR. Will quarantine while awaiting results.   Orders:  -     POCT SARS-CoV-2 Antigen CR + Flu  -     COVID-19,APTIMA PANTHER(DANA),[facility], NP/OP SWAB IN UTM/VTM/SALINE TRANSPORT MEDIA,24 HR TAT - Swab, Nasopharynx; Future  -     benzonatate (Tessalon Perles) 100 MG capsule; Take 1 capsule by mouth 3 (Three) Times a Day As Needed for Cough.  Dispense: 40 capsule; Refill: 0  -     COVID-19,APTIMA PANTHER(DANA),[facility], NP/OP SWAB IN UTM/VTM/SALINE TRANSPORT MEDIA,24 HR TAT - Swab, Nasopharynx    2. Tachycardia  Comments:  Likely due to fever and illness as his normal HR is 90s-100s.               Follow Up     Return for As needed for persistent or worsening symptoms, Next scheduled follow up, with PCP.

## 2022-11-01 ENCOUNTER — PATIENT MESSAGE (OUTPATIENT)
Dept: FAMILY MEDICINE CLINIC | Age: 32
End: 2022-11-01

## 2022-11-01 LAB — SARS-COV-2 RNA PNL SPEC NAA+PROBE: NOT DETECTED

## 2022-11-02 NOTE — TELEPHONE ENCOUNTER
From: Duane A Centers Jr.  To: Kari Zavaleta MD  Sent: 11/1/2022 9:03 PM EDT  Subject: Daily lexapro dosage     Hello Dr. Zavaleta, I was wondering if there's any possibility of increasing the daily dose of lexapro you have currently prescribed. I've noticed more feelings of depression, anxiety, and restlessness. Notice myself second guessing tasks at work, decreased interests in hobbies as well as tasks at my job. Increased trouble sleeping and feeling more agitated throughout the day. No big changes or other outside factors causing stress have recently occurred. Thank you.

## 2022-11-10 ENCOUNTER — OFFICE VISIT (OUTPATIENT)
Dept: FAMILY MEDICINE CLINIC | Age: 32
End: 2022-11-10

## 2022-11-10 VITALS
DIASTOLIC BLOOD PRESSURE: 83 MMHG | HEART RATE: 88 BPM | TEMPERATURE: 99.4 F | BODY MASS INDEX: 42.64 KG/M2 | HEIGHT: 71 IN | WEIGHT: 304.6 LBS | SYSTOLIC BLOOD PRESSURE: 130 MMHG

## 2022-11-10 DIAGNOSIS — D68.59 OTHER PRIMARY THROMBOPHILIA: ICD-10-CM

## 2022-11-10 DIAGNOSIS — F33.0 MAJOR DEPRESSIVE DISORDER, RECURRENT EPISODE, MILD DEGREE: ICD-10-CM

## 2022-11-10 DIAGNOSIS — F41.8 OTHER SPECIFIED ANXIETY DISORDERS: Primary | ICD-10-CM

## 2022-11-10 PROCEDURE — 99214 OFFICE O/P EST MOD 30 MIN: CPT | Performed by: FAMILY MEDICINE

## 2022-11-10 RX ORDER — PROPRANOLOL HYDROCHLORIDE 20 MG/1
20 TABLET ORAL 2 TIMES DAILY PRN
Qty: 60 TABLET | Refills: 5 | Status: SHIPPED | OUTPATIENT
Start: 2022-11-10 | End: 2023-02-21 | Stop reason: SDUPTHER

## 2022-11-10 NOTE — ASSESSMENT & PLAN NOTE
After discussion of his recent symptoms, it sounds like it is probably the anxiety that is increased and it is driving the mild increase in depressive symptoms.  I am going to increase his propranolol from 10 mg twice daily to 20 mg twice daily.  He has been on buspirone in the past and did not do well with that.  We are going to continue his Lexapro as well at 20 mg daily.  I will see him back in early December as scheduled to follow-up unless he is just doing really well following the change, in which case I will see him back in 3 months.

## 2022-11-10 NOTE — PROGRESS NOTES
"Chief Complaint  Follow-up (CC: Depression, & anxiety. Patient states that it has worsened. )    Subjective          Duane A Centers JrJamila presents to [facility] today for f/u of routine issues.    He is on Lexapro 20 mg daily and propranolol 10 mg twice daily for depression and anxiety.  Everything has been going consistently great, but he has noticed that his anxiety has really increased.  He does have some depressed mood, but the anxiety is the worst thing.  \"My fight or flight response has kicked in and I'm just living in that state.\"  He is not sleeping well.  He can't turn his brain off.  Anhedonia.  No crying spells.  +Worthlessness.  Intermittently poor concentration.  No change in energy level.  No SI.      He has been on buspirone in the past.  It caused some insomnia and did not help the anxiety.      Current Outpatient Medications:   •  escitalopram (LEXAPRO) 20 MG tablet, Take 1 tablet by mouth Daily., Disp: 90 tablet, Rfl: 1  •  propranolol (INDERAL) 20 MG tablet, Take 1 tablet by mouth 2 (Two) Times a Day As Needed (anxiety)., Disp: 60 tablet, Rfl: 5    Allergies:  Patient has no known allergies.      Objective   Vital Signs:   Vitals:    11/10/22 1427 11/10/22 1444   BP: 140/81 130/83   BP Location: Left arm Left arm   Patient Position: Sitting Sitting   Cuff Size: Adult Adult   Pulse: 89 88   Temp: 99.4 °F (37.4 °C)    TempSrc: Oral    Weight: (!) 138 kg (304 lb 9.6 oz)    Height: 179.1 cm (70.51\")        Physical Exam  Constitutional:       Appearance: Normal appearance.   HENT:      Head: Normocephalic and atraumatic.   Eyes:      Extraocular Movements: Extraocular movements intact.      Conjunctiva/sclera: Conjunctivae normal.   Pulmonary:      Effort: Pulmonary effort is normal. No respiratory distress.   Musculoskeletal:         General: Normal range of motion.   Skin:     General: Skin is warm and dry.   Neurological:      General: No focal deficit present.      " Mental Status: He is alert and oriented to person, place, and time.   Psychiatric:         Mood and Affect: Mood normal.         Behavior: Behavior normal.         Thought Content: Thought content normal.         Judgment: Judgment normal.          Lab Results   Component Value Date    GLUCOSE 85 08/19/2022    BUN 9 08/19/2022    CREATININE 0.94 08/19/2022    BCR 9.6 08/19/2022    K 4.5 08/19/2022    CO2 25.2 08/19/2022    CALCIUM 9.7 08/19/2022    ALBUMIN 4.60 08/19/2022    LABIL2 1.4 07/24/2019    AST 22 08/19/2022    ALT 26 08/19/2022       Lab Results   Component Value Date    CHOL 190 08/19/2022    TRIG 83 08/19/2022    HDL 42 08/19/2022     (H) 08/19/2022            Assessment and Plan    Diagnoses and all orders for this visit:    1. Other specified anxiety disorders (Primary)  Assessment & Plan:  After discussion of his recent symptoms, it sounds like it is probably the anxiety that is increased and it is driving the mild increase in depressive symptoms.  I am going to increase his propranolol from 10 mg twice daily to 20 mg twice daily.  He has been on buspirone in the past and did not do well with that.  We are going to continue his Lexapro as well at 20 mg daily.  I will see him back in early December as scheduled to follow-up unless he is just doing really well following the change, in which case I will see him back in 3 months.    Orders:  -     propranolol (INDERAL) 20 MG tablet; Take 1 tablet by mouth 2 (Two) Times a Day As Needed (anxiety).  Dispense: 60 tablet; Refill: 5    2. Major depressive disorder, recurrent episode, mild degree (HCC)  Assessment & Plan:  As per anxiety plan.      3. Other primary thrombophilia (HCC)  Overview:  S/p DVT.  Course of anticoagulation completed.  Lab testing completed by Hematology.  Lifelong anticoagulation not indicated at this time.        Follow Up   Return in 3 months (on 2/10/2023) for Recheck.  Patient was given instructions and counseling regarding  his condition or for health maintenance advice. Please see specific information pulled into the AVS if appropriate.           11/10/2022

## 2023-02-21 ENCOUNTER — TELEMEDICINE (OUTPATIENT)
Dept: FAMILY MEDICINE CLINIC | Age: 33
End: 2023-02-21
Payer: COMMERCIAL

## 2023-02-21 VITALS — BODY MASS INDEX: 39.2 KG/M2 | HEIGHT: 71 IN | WEIGHT: 280 LBS

## 2023-02-21 DIAGNOSIS — D68.59 OTHER PRIMARY THROMBOPHILIA: ICD-10-CM

## 2023-02-21 DIAGNOSIS — M25.562 ACUTE PAIN OF LEFT KNEE: ICD-10-CM

## 2023-02-21 DIAGNOSIS — F33.0 MAJOR DEPRESSIVE DISORDER, RECURRENT EPISODE, MILD DEGREE: Primary | ICD-10-CM

## 2023-02-21 DIAGNOSIS — F41.8 OTHER SPECIFIED ANXIETY DISORDERS: ICD-10-CM

## 2023-02-21 PROCEDURE — 99214 OFFICE O/P EST MOD 30 MIN: CPT | Performed by: FAMILY MEDICINE

## 2023-02-21 RX ORDER — MELOXICAM 15 MG/1
15 TABLET ORAL DAILY PRN
Qty: 30 TABLET | Refills: 0 | Status: SHIPPED | OUTPATIENT
Start: 2023-02-21

## 2023-02-21 RX ORDER — ESCITALOPRAM OXALATE 20 MG/1
20 TABLET ORAL DAILY
Qty: 90 TABLET | Refills: 1 | Status: SHIPPED | OUTPATIENT
Start: 2023-02-21 | End: 2023-03-31 | Stop reason: ALTCHOICE

## 2023-02-21 RX ORDER — PROPRANOLOL HYDROCHLORIDE 20 MG/1
20 TABLET ORAL 2 TIMES DAILY PRN
Qty: 180 TABLET | Refills: 1 | Status: SHIPPED | OUTPATIENT
Start: 2023-02-21

## 2023-02-21 NOTE — PROGRESS NOTES
"Chief Complaint  Anxiety (*video visit [phone]* 3 month follow up)    Duane A Centers Jr. has consented to use a telehealth visit for his medical care today: Yes.  Additional staff present for the encounter was Maci Lay MA.     This telehealth visit was conducted today via video conference.  I am present in at my home in [place] and conducting the visit via [employer] on my phone.  Duane is present at home and conducting his end of the visit using his smart phone.    Subjective          Duane A Centers Jr. presents to [facility] today for  routine follow-up on chronic issues.    He is on escitalopram 20 mg daily and propranolol 20 mg twice daily for depression and anxiety.  Everything seems to be going well since we increased his propranolol 20 mg last visit.  Mood is \"good.\"  No adverse effects.  He has used buspirone in the past but had trouble tolerating that.      He has had a lot of pain and aching in the L knee.  He has a remote injury to the knee when he was 17 yo when he was pulling fence posts and one snapped.  It swelled up at the time but then spontaneously resolved and he never had to seek are at that time.  He never had problems after that, but for the past 2-3 months, he has noticed increasing pain.  He does kneel and climb ladders consistently for his work.  He has been using heat and ice, NSAIDs, no real relief.          Current Outpatient Medications:   •  escitalopram (LEXAPRO) 20 MG tablet, Take 1 tablet by mouth Daily., Disp: 90 tablet, Rfl: 1  •  propranolol (INDERAL) 20 MG tablet, Take 1 tablet by mouth 2 (Two) Times a Day As Needed (anxiety)., Disp: 180 tablet, Rfl: 1  •  meloxicam (MOBIC) 15 MG tablet, Take 1 tablet by mouth Daily As Needed (joint pain). Take with food., Disp: 30 tablet, Rfl: 0    Allergies:  Patient has no known allergies.      Objective   Vital Signs:   Vitals:    02/21/23 1547   Weight: 127 kg (280 lb)   Height: 179.1 cm (70.51\") "       Physical Exam  Constitutional:       Appearance: Normal appearance.   HENT:      Head: Normocephalic and atraumatic.   Eyes:      Extraocular Movements: Extraocular movements intact.      Conjunctiva/sclera: Conjunctivae normal.   Pulmonary:      Effort: Pulmonary effort is normal. No respiratory distress.   Musculoskeletal:         General: Normal range of motion.   Skin:     General: Skin is warm and dry.   Neurological:      General: No focal deficit present.      Mental Status: He is alert and oriented to person, place, and time.   Psychiatric:         Mood and Affect: Mood normal.         Behavior: Behavior normal.         Thought Content: Thought content normal.         Judgment: Judgment normal.          Lab Results   Component Value Date    GLUCOSE 85 08/19/2022    BUN 9 08/19/2022    CREATININE 0.94 08/19/2022    BCR 9.6 08/19/2022    K 4.5 08/19/2022    CO2 25.2 08/19/2022    CALCIUM 9.7 08/19/2022    ALBUMIN 4.60 08/19/2022    LABIL2 1.4 07/24/2019    AST 22 08/19/2022    ALT 26 08/19/2022       Lab Results   Component Value Date    CHOL 190 08/19/2022    TRIG 83 08/19/2022    HDL 42 08/19/2022     (H) 08/19/2022            Assessment and Plan    Diagnoses and all orders for this visit:    1. Major depressive disorder, recurrent episode, mild degree (HCC) (Primary)  Assessment & Plan:  Doing great on escitalopram 20 mg daily and propranolol 20 mg twice daily as needed for depression and anxiety.  Symptoms have been well controlled ever since we increased his propranolol at last visit.  Refill sent for 90 days on both meds today.  Continue to monitor.    Orders:  -     escitalopram (LEXAPRO) 20 MG tablet; Take 1 tablet by mouth Daily.  Dispense: 90 tablet; Refill: 1    2. Other specified anxiety disorders  Assessment & Plan:  As per depression plan.    Orders:  -     propranolol (INDERAL) 20 MG tablet; Take 1 tablet by mouth 2 (Two) Times a Day As Needed (anxiety).  Dispense: 180 tablet;  Refill: 1    3. Acute pain of left knee  Assessment & Plan:  Acute L knee pain for the past 2-3 months, no obvious inciting event.  He does have remote history of trauma to that knee but never sought care at the time.  Continue conservative therapy at home and I will send in some meloxicam for him to try.  Checking XR.    Orders:  -     XR Knee 3 View Left; Future  -     meloxicam (MOBIC) 15 MG tablet; Take 1 tablet by mouth Daily As Needed (joint pain). Take with food.  Dispense: 30 tablet; Refill: 0    4. Other primary thrombophilia (HCC)  Overview:  S/p DVT.  Course of anticoagulation completed.  Lab testing completed by Hematology.  Lifelong anticoagulation not indicated at this time.        Follow Up   Return in about 6 months (around 8/21/2023) for Annual physical.  Patient was given instructions and counseling regarding his condition or for health maintenance advice. Please see specific information pulled into the AVS if appropriate.           11/10/2022

## 2023-02-21 NOTE — ASSESSMENT & PLAN NOTE
Acute L knee pain for the past 2-3 months, no obvious inciting event.  He does have remote history of trauma to that knee but never sought care at the time.  Continue conservative therapy at home and I will send in some meloxicam for him to try.  Checking XR.

## 2023-02-21 NOTE — ASSESSMENT & PLAN NOTE
Doing great on escitalopram 20 mg daily and propranolol 20 mg twice daily as needed for depression and anxiety.  Symptoms have been well controlled ever since we increased his propranolol at last visit.  Refill sent for 90 days on both meds today.  Continue to monitor.

## 2023-03-13 ENCOUNTER — TELEPHONE (OUTPATIENT)
Dept: FAMILY MEDICINE CLINIC | Age: 33
End: 2023-03-13
Payer: COMMERCIAL

## 2023-03-27 ENCOUNTER — HOSPITAL ENCOUNTER (OUTPATIENT)
Dept: GENERAL RADIOLOGY | Facility: HOSPITAL | Age: 33
Discharge: HOME | End: 2023-03-27
Admitting: FAMILY MEDICINE
Payer: COMMERCIAL

## 2023-03-27 DIAGNOSIS — M25.562 ACUTE PAIN OF LEFT KNEE: ICD-10-CM

## 2023-03-27 PROCEDURE — 73562 X-RAY EXAM OF KNEE 3: CPT

## 2023-03-31 ENCOUNTER — OFFICE VISIT (OUTPATIENT)
Dept: FAMILY MEDICINE CLINIC | Age: 33
End: 2023-03-31
Payer: COMMERCIAL

## 2023-03-31 VITALS
HEART RATE: 59 BPM | OXYGEN SATURATION: 98 % | SYSTOLIC BLOOD PRESSURE: 123 MMHG | BODY MASS INDEX: 42.5 KG/M2 | HEIGHT: 71 IN | WEIGHT: 303.6 LBS | DIASTOLIC BLOOD PRESSURE: 79 MMHG

## 2023-03-31 DIAGNOSIS — F33.0 MAJOR DEPRESSIVE DISORDER, RECURRENT EPISODE, MILD DEGREE: ICD-10-CM

## 2023-03-31 DIAGNOSIS — F41.8 OTHER SPECIFIED ANXIETY DISORDERS: ICD-10-CM

## 2023-03-31 DIAGNOSIS — M54.42 ACUTE BILATERAL LOW BACK PAIN WITH BILATERAL SCIATICA: Primary | ICD-10-CM

## 2023-03-31 DIAGNOSIS — E66.01 MORBID (SEVERE) OBESITY DUE TO EXCESS CALORIES: ICD-10-CM

## 2023-03-31 DIAGNOSIS — M54.41 ACUTE BILATERAL LOW BACK PAIN WITH BILATERAL SCIATICA: Primary | ICD-10-CM

## 2023-03-31 PROBLEM — M25.562 ACUTE PAIN OF LEFT KNEE: Status: RESOLVED | Noted: 2023-02-21 | Resolved: 2023-03-31

## 2023-03-31 PROBLEM — M54.50 ACUTE LOW BACK PAIN: Status: ACTIVE | Noted: 2023-03-31

## 2023-03-31 RX ORDER — DULOXETIN HYDROCHLORIDE 30 MG/1
30 CAPSULE, DELAYED RELEASE ORAL DAILY
Qty: 30 CAPSULE | Refills: 5 | Status: SHIPPED | OUTPATIENT
Start: 2023-03-31

## 2023-03-31 RX ORDER — TIZANIDINE 4 MG/1
4 TABLET ORAL 2 TIMES DAILY PRN
Qty: 60 TABLET | Refills: 0 | Status: SHIPPED | OUTPATIENT
Start: 2023-03-31

## 2023-03-31 NOTE — ASSESSMENT & PLAN NOTE
Patient's (Body mass index is 42.93 kg/m².) indicates that they are morbidly/severely obese (BMI > 40 or > 35 with obesity - related health condition) with health conditions that include none . Weight is worsening. BMI  is above average; BMI management plan is completed. We discussed portion control and increasing exercise.

## 2023-03-31 NOTE — PROGRESS NOTES
"Chief Complaint  Sciatica (Back pain traveling down into leg x 2 weeks )    Subjective          Duane A Centers Jr. presents to [facility] today for acute issue of low back pain with sciatica for the past 2 weeks.    He has had intermittent problems with pain in the low back for years now.  No obvious triggers that set off this flare.  Some days it may cause pain for 30 min and some days the pain lasts all day.  Bilateral low back, radiating down both legs.  R=L.  Pain in the low back is burning.  Pain radiates down to the back of the knees.  No numbness or tingling, no weakness.  He has been using meloxicam but to no good effect.  Pain gets worst about mid-day and lasts until he gets home from work and can get off his feet.  Pain is relieved with sitting.  He works in maintenance.    He is having a lot of anhedonia.  Lexapro does not seem to be helping anymore.        Current Outpatient Medications:   •  meloxicam (MOBIC) 15 MG tablet, Take 1 tablet by mouth Daily As Needed (joint pain). Take with food., Disp: 30 tablet, Rfl: 0  •  propranolol (INDERAL) 20 MG tablet, Take 1 tablet by mouth 2 (Two) Times a Day As Needed (anxiety)., Disp: 180 tablet, Rfl: 1  •  DULoxetine (CYMBALTA) 30 MG capsule, Take 1 capsule by mouth Daily., Disp: 30 capsule, Rfl: 5  •  tiZANidine (ZANAFLEX) 4 MG tablet, Take 1 tablet by mouth 2 (Two) Times a Day As Needed for Muscle Spasms., Disp: 60 tablet, Rfl: 0    Allergies:  Patient has no known allergies.      Objective   Vital Signs:   Vitals:    03/31/23 1313   BP: 123/79   BP Location: Right arm   Patient Position: Sitting   Cuff Size: Adult   Pulse: 59   SpO2: 98%   Weight: (!) 138 kg (303 lb 9.6 oz)   Height: 179.1 cm (70.51\")       Physical Exam  Constitutional:       Appearance: Normal appearance.   HENT:      Head: Normocephalic and atraumatic.   Eyes:      Extraocular Movements: Extraocular movements intact.      Conjunctiva/sclera: Conjunctivae " normal.   Pulmonary:      Effort: Pulmonary effort is normal. No respiratory distress.   Musculoskeletal:         General: Normal range of motion.   Skin:     General: Skin is warm and dry.   Neurological:      General: No focal deficit present.      Mental Status: He is alert and oriented to person, place, and time.   Psychiatric:         Mood and Affect: Mood normal.         Behavior: Behavior normal.         Thought Content: Thought content normal.         Judgment: Judgment normal.           Lab Results   Component Value Date    GLUCOSE 85 08/19/2022    BUN 9 08/19/2022    CREATININE 0.94 08/19/2022    BCR 9.6 08/19/2022    K 4.5 08/19/2022    CO2 25.2 08/19/2022    CALCIUM 9.7 08/19/2022    ALBUMIN 4.60 08/19/2022    LABIL2 1.4 07/24/2019    AST 22 08/19/2022    ALT 26 08/19/2022       Lab Results   Component Value Date    CHOL 190 08/19/2022    TRIG 83 08/19/2022    HDL 42 08/19/2022     (H) 08/19/2022            Assessment and Plan    Diagnoses and all orders for this visit:    1. Acute bilateral low back pain with bilateral sciatica (Primary)  Assessment & Plan:  Acute low back pain for the past 2 weeks, likely due to acute muscle strain.  I am going to send him in a muscle relaxer as below to try.  He may continue NSAIDs as well.  Conservative therapy recommended with alternating heat and ice in addition to gentle stretching and activity.  Low back exercises handout given today.  Referral placed to Physical Therapy.  Call or return to clinic as needed worsening or failure to improve of symptoms.      Orders:  -     tiZANidine (ZANAFLEX) 4 MG tablet; Take 1 tablet by mouth 2 (Two) Times a Day As Needed for Muscle Spasms.  Dispense: 60 tablet; Refill: 0  -     Ambulatory Referral to Physical Therapy Evaluate and treat    2. Morbid (severe) obesity due to excess calories (HCC)  Assessment & Plan:  Patient's (Body mass index is 42.93 kg/m².) indicates that they are morbidly/severely obese (BMI > 40 or >  35 with obesity - related health condition) with health conditions that include none . Weight is worsening. BMI  is above average; BMI management plan is completed. We discussed portion control and increasing exercise.       3. Major depressive disorder, recurrent episode, mild degree (HCC)  Assessment & Plan:  Plateauing of Lexapro's effect.  It is no longer working very well for him particularly in regards to motivation.  Cross taper with decreasing Lexapro to one half tab p.o. daily x2 weeks, this then discontinue.  At the same time, start duloxetine 30 mg daily.  I will see him back as scheduled in May to reevaluate.    Orders:  -     DULoxetine (CYMBALTA) 30 MG capsule; Take 1 capsule by mouth Daily.  Dispense: 30 capsule; Refill: 5    4. Other specified anxiety disorders  Assessment & Plan:  As per depression plan.    Orders:  -     DULoxetine (CYMBALTA) 30 MG capsule; Take 1 capsule by mouth Daily.  Dispense: 30 capsule; Refill: 5      Follow Up   Return if symptoms worsen or fail to improve, for or as scheduled 5/16/23.  Patient was given instructions and counseling regarding his condition or for health maintenance advice. Please see specific information pulled into the AVS if appropriate.           03/31/2023

## 2023-03-31 NOTE — ASSESSMENT & PLAN NOTE
Plateauing of Lexapro's effect.  It is no longer working very well for him particularly in regards to motivation.  Cross taper with decreasing Lexapro to one half tab p.o. daily x2 weeks, this then discontinue.  At the same time, start duloxetine 30 mg daily.  I will see him back as scheduled in May to reevaluate.

## 2023-03-31 NOTE — ASSESSMENT & PLAN NOTE
Acute low back pain for the past 2 weeks, likely due to acute muscle strain.  I am going to send him in a muscle relaxer as below to try.  He may continue NSAIDs as well.  Conservative therapy recommended with alternating heat and ice in addition to gentle stretching and activity.  Low back exercises handout given today.  Referral placed to Physical Therapy.  Call or return to clinic as needed worsening or failure to improve of symptoms.

## 2023-04-10 ENCOUNTER — PATIENT MESSAGE (OUTPATIENT)
Dept: FAMILY MEDICINE CLINIC | Age: 33
End: 2023-04-10
Payer: COMMERCIAL

## 2023-04-10 DIAGNOSIS — F41.8 OTHER SPECIFIED ANXIETY DISORDERS: ICD-10-CM

## 2023-04-10 DIAGNOSIS — F33.0 MAJOR DEPRESSIVE DISORDER, RECURRENT EPISODE, MILD DEGREE: ICD-10-CM

## 2023-04-11 RX ORDER — DULOXETIN HYDROCHLORIDE 60 MG/1
60 CAPSULE, DELAYED RELEASE ORAL DAILY
Qty: 30 CAPSULE | Refills: 5 | Status: SHIPPED | OUTPATIENT
Start: 2023-04-11

## 2023-04-11 NOTE — TELEPHONE ENCOUNTER
From: Duane A Centers Jr.  To: Kari Zavaleta  Sent: 4/10/2023 10:56 PM EDT  Subject: Newest medication    Any chance of increasing the milligrams of the Cymbalta or propranolol? Still having strong feelings of being unhappy and uninterested in anything. Thank you.

## 2023-05-16 ENCOUNTER — TELEPHONE (OUTPATIENT)
Dept: FAMILY MEDICINE CLINIC | Age: 33
End: 2023-05-16

## 2023-05-16 ENCOUNTER — TELEMEDICINE (OUTPATIENT)
Dept: FAMILY MEDICINE CLINIC | Age: 33
End: 2023-05-16
Payer: COMMERCIAL

## 2023-05-16 VITALS — WEIGHT: 285 LBS | BODY MASS INDEX: 39.9 KG/M2 | HEIGHT: 71 IN

## 2023-05-16 DIAGNOSIS — F33.0 MAJOR DEPRESSIVE DISORDER, RECURRENT EPISODE, MILD DEGREE: Primary | ICD-10-CM

## 2023-05-16 DIAGNOSIS — M54.42 CHRONIC BILATERAL LOW BACK PAIN WITH BILATERAL SCIATICA: ICD-10-CM

## 2023-05-16 DIAGNOSIS — G89.29 CHRONIC BILATERAL LOW BACK PAIN WITH BILATERAL SCIATICA: ICD-10-CM

## 2023-05-16 DIAGNOSIS — M54.41 CHRONIC BILATERAL LOW BACK PAIN WITH BILATERAL SCIATICA: ICD-10-CM

## 2023-05-16 DIAGNOSIS — F41.8 OTHER SPECIFIED ANXIETY DISORDERS: ICD-10-CM

## 2023-05-16 PROCEDURE — 99214 OFFICE O/P EST MOD 30 MIN: CPT | Performed by: FAMILY MEDICINE

## 2023-05-16 RX ORDER — DULOXETIN HYDROCHLORIDE 60 MG/1
60 CAPSULE, DELAYED RELEASE ORAL 2 TIMES DAILY
Qty: 60 CAPSULE | Refills: 0 | Status: SHIPPED | OUTPATIENT
Start: 2023-05-16

## 2023-05-16 NOTE — ASSESSMENT & PLAN NOTE
Cymbalta at 60 mg once daily is doing better for him than the Lexapro was previously doing, but symptoms remain incompletely controlled.  We will increase his Cymbalta to twice daily and reassess again in 4 weeks.  We will call him to make that appointment.  At that time, we could consider either adding an adjunct therapy.  Wellbutrin might be a good option for him if he has never taken this in the past.  Alternatively, we could try switching him to something else altogether.

## 2023-05-16 NOTE — ASSESSMENT & PLAN NOTE
Back pain is better but he continues to have intermittent problems with this, as he has done for years.  He continues on meloxicam 15 mg daily as needed at this time.  No longer using the Zanaflex.  I think he would really benefit from a referral to Physical Therapy for some rehab to see if we cannot get a better handle on this back pain long-term.  He is agreeable to this.  Referral placed as below.

## 2023-05-16 NOTE — PROGRESS NOTES
"Chief Complaint  Anxiety (*video visit [phone]* ) and Back Pain    Duane A Centers Jr. has consented to use a telehealth visit for his medical care today: Yes.  Additional staff present for the encounter was Maci Lay MA.     This telehealth visit was conducted today via video conference.  I am present in the office and conducting the visit via Doximity on my phone.  Duane is present at home and conducting his end of the visit using his smart phone.      Subjective          Duane A Centers Jr. presents to [facility] today for f/u of routine problems.    At his last visit 6 weeks ago, he was taken down off of the Lexapro and started instead on duloxetine 30 mg daily.  A couple of weeks later, we increased to 60 mg daily, so he has now been at that dose for about a month.  Things \"aren't too bad.\"  Still having some anhedonia but things are better than they were on the Lexapro.  However, he still feels both some anxiety and depressed mood.  Difficulty with early insomnia.  That has been an ongoing issues.  No adverse effects.    He was also complaining of a lot of acute on chronic back pain with bilateral sciatica.  That continues to be an issue for him intermittently.  He is still using meloxicam but he is off the muscle relaxer.  He has had back problems \"as long as I can remember.\"  The sciatica has eased off.  He has a very physical job.        Current Outpatient Medications:   •  DULoxetine (CYMBALTA) 60 MG capsule, Take 1 capsule by mouth 2 (Two) Times a Day., Disp: 60 capsule, Rfl: 0  •  meloxicam (MOBIC) 15 MG tablet, Take 1 tablet by mouth Daily As Needed (joint pain). Take with food., Disp: 30 tablet, Rfl: 0  •  propranolol (INDERAL) 20 MG tablet, Take 1 tablet by mouth 2 (Two) Times a Day As Needed (anxiety)., Disp: 180 tablet, Rfl: 1    Allergies:  Patient has no known allergies.      Objective   Vital Signs:   Vitals:    05/16/23 1534   Weight: 129 kg (285 " "lb)  Comment: per patient   Height: 179.1 cm (70.51\")       Physical Exam  Constitutional:       Appearance: Normal appearance.   HENT:      Head: Normocephalic and atraumatic.   Eyes:      Extraocular Movements: Extraocular movements intact.      Conjunctiva/sclera: Conjunctivae normal.   Pulmonary:      Effort: Pulmonary effort is normal. No respiratory distress.   Musculoskeletal:         General: Normal range of motion.   Skin:     General: Skin is warm and dry.   Neurological:      General: No focal deficit present.      Mental Status: He is alert and oriented to person, place, and time.   Psychiatric:         Mood and Affect: Mood normal.         Behavior: Behavior normal.         Thought Content: Thought content normal.         Judgment: Judgment normal.           Lab Results   Component Value Date    GLUCOSE 85 08/19/2022    BUN 9 08/19/2022    CREATININE 0.94 08/19/2022    BCR 9.6 08/19/2022    K 4.5 08/19/2022    CO2 25.2 08/19/2022    CALCIUM 9.7 08/19/2022    ALBUMIN 4.60 08/19/2022    LABIL2 1.4 07/24/2019    AST 22 08/19/2022    ALT 26 08/19/2022       Lab Results   Component Value Date    CHOL 190 08/19/2022    TRIG 83 08/19/2022    HDL 42 08/19/2022     (H) 08/19/2022            Assessment and Plan    Diagnoses and all orders for this visit:    1. Major depressive disorder, recurrent episode, mild degree (Primary)  Assessment & Plan:  Cymbalta at 60 mg once daily is doing better for him than the Lexapro was previously doing, but symptoms remain incompletely controlled.  We will increase his Cymbalta to twice daily and reassess again in 4 weeks.  We will call him to make that appointment.  At that time, we could consider either adding an adjunct therapy.  Wellbutrin might be a good option for him if he has never taken this in the past.  Alternatively, we could try switching him to something else altogether.    Orders:  -     DULoxetine (CYMBALTA) 60 MG capsule; Take 1 capsule by mouth 2 (Two) " Times a Day.  Dispense: 60 capsule; Refill: 0    2. Other specified anxiety disorders  Assessment & Plan:  As per depression plan.    Orders:  -     DULoxetine (CYMBALTA) 60 MG capsule; Take 1 capsule by mouth 2 (Two) Times a Day.  Dispense: 60 capsule; Refill: 0    3. Chronic bilateral low back pain with bilateral sciatica  Assessment & Plan:  Back pain is better but he continues to have intermittent problems with this, as he has done for years.  He continues on meloxicam 15 mg daily as needed at this time.  No longer using the Zanaflex.  I think he would really benefit from a referral to Physical Therapy for some rehab to see if we cannot get a better handle on this back pain long-term.  He is agreeable to this.  Referral placed as below.    Orders:  -     Ambulatory Referral to Physical Therapy Evaluate and treat      Follow Up   Return if symptoms worsen or fail to improve, for or as scheduled 8/22/2023.  Patient was given instructions and counseling regarding his condition or for health maintenance advice. Please see specific information pulled into the AVS if appropriate.           05/16/2023

## 2023-05-17 ENCOUNTER — TELEPHONE (OUTPATIENT)
Dept: FAMILY MEDICINE CLINIC | Age: 33
End: 2023-05-17
Payer: COMMERCIAL

## 2023-05-17 NOTE — TELEPHONE ENCOUNTER
----- Message from Kari Zavaleta MD sent at 5/16/2023  3:48 PM EDT -----  Please call pt to schedule a follow-up appt for 4 weeks (f/u depression).  Thanks, JOEL

## 2023-07-05 ENCOUNTER — TELEPHONE (OUTPATIENT)
Dept: PHYSICAL THERAPY | Facility: CLINIC | Age: 33
End: 2023-07-05

## 2023-07-24 ENCOUNTER — TREATMENT (OUTPATIENT)
Dept: PHYSICAL THERAPY | Facility: CLINIC | Age: 33
End: 2023-07-24
Payer: COMMERCIAL

## 2023-07-24 DIAGNOSIS — G89.29 CHRONIC LOW BACK PAIN WITH SCIATICA, SCIATICA LATERALITY UNSPECIFIED, UNSPECIFIED BACK PAIN LATERALITY: ICD-10-CM

## 2023-07-24 DIAGNOSIS — M54.40 CHRONIC LOW BACK PAIN WITH SCIATICA, SCIATICA LATERALITY UNSPECIFIED, UNSPECIFIED BACK PAIN LATERALITY: ICD-10-CM

## 2023-07-24 DIAGNOSIS — M54.42 CHRONIC BILATERAL LOW BACK PAIN WITH BILATERAL SCIATICA: ICD-10-CM

## 2023-07-24 DIAGNOSIS — M54.41 CHRONIC BILATERAL LOW BACK PAIN WITH BILATERAL SCIATICA: ICD-10-CM

## 2023-07-24 DIAGNOSIS — G89.29 CHRONIC BILATERAL LOW BACK PAIN WITH BILATERAL SCIATICA: ICD-10-CM

## 2023-07-24 DIAGNOSIS — R29.898 DECREASED STRENGTH OF TRUNK AND BACK: Primary | ICD-10-CM

## 2023-07-24 PROCEDURE — 97110 THERAPEUTIC EXERCISES: CPT | Performed by: PHYSICAL THERAPIST

## 2023-07-24 PROCEDURE — 97112 NEUROMUSCULAR REEDUCATION: CPT | Performed by: PHYSICAL THERAPIST

## 2023-07-24 PROCEDURE — 97530 THERAPEUTIC ACTIVITIES: CPT | Performed by: PHYSICAL THERAPIST

## 2023-07-24 NOTE — PROGRESS NOTES
Physical Therapy Daily Treatment Note      Patient: Duane A Centers Jr.   : 1990  Referring practitioner: Kari Zavaleta,*  Date of Initial Visit: Type: THERAPY  Noted: 2023  Today's Date: 2023  Patient seen for 7 sessions           Subjective Evaluation    History of Present Illness    Subjective comment: 4/10     Objective   See Exercise, Manual, and Modality Logs for complete treatment.       Assessment & Plan     Assessment  Impairments: abnormal or restricted ROM, activity intolerance, impaired physical strength, lacks appropriate home exercise program and pain with function    Assessment details: Pt started new exercises requiring vc and demonstration for safe and effective performance. Stabilization exercises cont to show deficits but pt is improving. Balancing core activity needs to be readdressed in the future in order to address balance and core stability together.      Goals  Plan Goals: LOW BACK PROBLEMS:    1. The patient complains of low back pain.  LTG 1: 12 weeks:  The patient will report a pain rating of 1/10 or better in order to improve tolerance to activities of daily living and improve sleep quality.  STATUS:  Progressing  STG 1a: 4 weeks:  The patient will report a pain rating of 2/10 or better.  STATUS:  Progressing  TREATMENT:  Therapeutic exercises, manual therapy, aquatic therapy, home exercise instruction, and modalities as needed for pain to include:  electrical stimulation, moist heat, ice, ultrasound, and diathermy.      2. The patient demonstrates weakness of the B hip.  LTG 2: 12 weeks:  The patient will demonstrate 4+ /5 strength for B hip flexion, abduction, and extension in order to improve hip stability.  STATUS:  Progressing  STG 2a: 4 weeks:  The patient will demonstrate 4 /5 strength for B hip flexion, abduction, and extension.  STATUS:  Progressing  STG2b:  4 weeks:  The patient will be independent with home exercises.     STATUS:   Progressing  TREATMENT: Therapeutic exercises, manual therapy, aquatic therapy, home exercise instruction, and modalities as needed for pain to include:  electrical stimulation, moist heat, ice, ultrasound, and diathermy.    3. Mobility: Walking/Moving Around Functional Limitation    LTG 3: 12 weeks:  The patient will demonstrate limitation by achieving a score of 0/50 on the JAMES.  STATUS:  Progressing  STG 3 a: 4 weeks:  The patient will demonstrate limitation by achieving a score of 2/50 on the JAMES.    STATUS:  Progressing  TREATMENT:  Manual therapy, therapeutic exercise, home exercise instruction, and modalities as needed.      Plan  Therapy options: will be seen for skilled therapy services  Planned modality interventions: cryotherapy, thermotherapy (hydrocollator packs), dry needling, TENS and ultrasound  Planned therapy interventions: abdominal trunk stabilization, manual therapy, flexibility, functional ROM exercises, gait training, home exercise program, therapeutic activities, stretching, strengthening, spinal/joint mobilization, soft tissue mobilization, postural training, neuromuscular re-education, body mechanics training and ADL retraining  Frequency: 2x week  Duration in weeks: 8  Treatment plan discussed with: patient  Plan details: Progress lumbar/BLE flexibility, core/trunk stabilization, balance, posture. Readdress quadruped balance activity.        Visit Diagnoses:    ICD-10-CM ICD-9-CM   1. Decreased strength of trunk and back  R29.898 729.89   2. Chronic bilateral low back pain with bilateral sciatica  M54.42 724.2    M54.41 724.3    G89.29 338.29   3. Chronic low back pain with sciatica, sciatica laterality unspecified, unspecified back pain laterality  M54.40 724.2    G89.29 724.3     338.29       Progress per Plan of Care    Timed:    Therapeutic Exercise:    8     mins  71327;  Manual Therapy:         mins  15755;     Neuromuscular Nick:   10    mins  17301;    Therapeutic Activity:     10      mins  25141;     Gait Training:           mins  34217;     Ultrasound:          mins  72783;      Untimed:  Electrical Stimulation:         mins  90080 ( );  Mechanical Traction:         mins  28798;     Timed Treatment:   28   mins   Total Treatment:     32   mins      Shalonda Long PTA  Physical Therapist Assistant

## 2023-07-28 ENCOUNTER — TELEPHONE (OUTPATIENT)
Dept: PHYSICAL THERAPY | Facility: CLINIC | Age: 33
End: 2023-07-28

## 2023-08-01 ENCOUNTER — TELEPHONE (OUTPATIENT)
Dept: PHYSICAL THERAPY | Facility: CLINIC | Age: 33
End: 2023-08-01

## 2023-08-22 ENCOUNTER — TELEMEDICINE (OUTPATIENT)
Dept: FAMILY MEDICINE CLINIC | Age: 33
End: 2023-08-22
Payer: COMMERCIAL

## 2023-08-22 VITALS — WEIGHT: 280 LBS | HEIGHT: 71 IN | BODY MASS INDEX: 39.2 KG/M2

## 2023-08-22 DIAGNOSIS — E66.01 MORBID (SEVERE) OBESITY DUE TO EXCESS CALORIES: ICD-10-CM

## 2023-08-22 DIAGNOSIS — F41.1 GENERALIZED ANXIETY DISORDER: ICD-10-CM

## 2023-08-22 DIAGNOSIS — G89.29 CHRONIC BILATERAL LOW BACK PAIN WITH BILATERAL SCIATICA: ICD-10-CM

## 2023-08-22 DIAGNOSIS — M54.42 CHRONIC BILATERAL LOW BACK PAIN WITH BILATERAL SCIATICA: ICD-10-CM

## 2023-08-22 DIAGNOSIS — M54.41 CHRONIC BILATERAL LOW BACK PAIN WITH BILATERAL SCIATICA: ICD-10-CM

## 2023-08-22 DIAGNOSIS — F33.0 MAJOR DEPRESSIVE DISORDER, RECURRENT EPISODE, MILD DEGREE: Primary | ICD-10-CM

## 2023-08-22 DIAGNOSIS — D68.59 OTHER PRIMARY THROMBOPHILIA: ICD-10-CM

## 2023-08-22 PROCEDURE — 99214 OFFICE O/P EST MOD 30 MIN: CPT | Performed by: FAMILY MEDICINE

## 2023-08-22 RX ORDER — MELOXICAM 15 MG/1
15 TABLET ORAL DAILY PRN
Qty: 30 TABLET | Refills: 5 | Status: SHIPPED | OUTPATIENT
Start: 2023-08-22

## 2023-08-22 RX ORDER — DULOXETIN HYDROCHLORIDE 60 MG/1
60 CAPSULE, DELAYED RELEASE ORAL 2 TIMES DAILY
Qty: 180 CAPSULE | Refills: 1 | Status: SHIPPED | OUTPATIENT
Start: 2023-08-22

## 2023-08-22 RX ORDER — PROPRANOLOL HYDROCHLORIDE 20 MG/1
20 TABLET ORAL 2 TIMES DAILY PRN
Qty: 180 TABLET | Refills: 1 | Status: SHIPPED | OUTPATIENT
Start: 2023-08-22

## 2023-08-22 NOTE — ASSESSMENT & PLAN NOTE
Patient's (Body mass index is 39.59 kg/mý.) indicates that they are morbidly/severely obese (BMI > 40 or > 35 with obesity - related health condition) with health conditions that include none . Weight is unchanged. BMI  is above average; BMI management plan is completed. Continue portion control and increasing exercise.

## 2023-08-22 NOTE — ASSESSMENT & PLAN NOTE
Chronic low back pain with bilateral sciatica persists despite conservative management with over-the-counter analgesics, muscle relaxers, duloxetine and prescription NSAIDs.  He has completed a 6-week course of PT with no significant improvement.  MRI L-spine has been ordered today and we will plan to get him in with Dr. [facility] once the MRI results return.

## 2023-08-22 NOTE — PROGRESS NOTES
"Chief Complaint  Anxiety (*video visit [phone]* 6 month f/u )    Duane A Centers Jr. has consented to use a telehealth visit for his medical care today: Yes.  Additional staff present for the encounter was Maci Lay MA.   This telehealth visit was conducted today via video conference.  I am present in the office and conducting the visit via Doximity on my phone.  Duane is present at home and conducting his end of the visit using his smart phone.      Subjective          Duane A Centers Jr. presents to [facility] today for follow-up of routine problems.    He is on duloxetine 60 mg twice daily for depression and anxiety still having some anhedonia.  Previously was on Lexapro but that effect plateaued.  Mood has been \"pretty good.\"  He is also using propranolol 20 mg twice daily as needed for the anxiety.    He is on meloxicam for low back pain and this gives him some relief but incomplete.  He has tried Tylenol, OTC NSAIDs, and muscle relaxers.  He has completed 6 weeks of PT.  Despite this, he is not getting any sustained relief.        Current Outpatient Medications:     DULoxetine (CYMBALTA) 60 MG capsule, Take 1 capsule by mouth 2 (Two) Times a Day., Disp: 180 capsule, Rfl: 1    meloxicam (MOBIC) 15 MG tablet, Take 1 tablet by mouth Daily As Needed (joint pain). Take with food., Disp: 30 tablet, Rfl: 5    propranolol (INDERAL) 20 MG tablet, Take 1 tablet by mouth 2 (Two) Times a Day As Needed (anxiety)., Disp: 180 tablet, Rfl: 1    Allergies:  Patient has no known allergies.      Objective   Vital Signs:   Vitals:    08/22/23 1530   Weight: 127 kg (280 lb)   Height: 179.1 cm (70.51\")       Physical Exam  Constitutional:       Appearance: Normal appearance.   HENT:      Head: Normocephalic and atraumatic.   Eyes:      Extraocular Movements: Extraocular movements intact.      Conjunctiva/sclera: Conjunctivae normal.   Pulmonary:      Effort: Pulmonary effort is normal. No " respiratory distress.   Musculoskeletal:         General: Normal range of motion.   Skin:     General: Skin is warm and dry.   Neurological:      General: No focal deficit present.      Mental Status: He is alert and oriented to person, place, and time.   Psychiatric:         Mood and Affect: Mood normal.         Behavior: Behavior normal.         Thought Content: Thought content normal.         Judgment: Judgment normal.         Lab Results   Component Value Date    GLUCOSE 85 08/19/2022    BUN 9 08/19/2022    CREATININE 0.94 08/19/2022    BCR 9.6 08/19/2022    K 4.5 08/19/2022    CO2 25.2 08/19/2022    CALCIUM 9.7 08/19/2022    ALBUMIN 4.60 08/19/2022    LABIL2 1.4 07/24/2019    AST 22 08/19/2022    ALT 26 08/19/2022       Lab Results   Component Value Date    CHOL 190 08/19/2022    TRIG 83 08/19/2022    HDL 42 08/19/2022     (H) 08/19/2022            Assessment and Plan    Diagnoses and all orders for this visit:    1. Major depressive disorder, recurrent episode, mild degree (Primary)  Assessment & Plan:  Doing much better on the duloxetine at 60 mg twice daily along with the propranolol 20 mg twice daily as needed for anxiety.  Mood symptoms are well controlled.  Refills sent today.  Continue to monitor.    Orders:  -     DULoxetine (CYMBALTA) 60 MG capsule; Take 1 capsule by mouth 2 (Two) Times a Day.  Dispense: 180 capsule; Refill: 1    2. Generalized anxiety disorder  Assessment & Plan:  Per depression plan.    Orders:  -     DULoxetine (CYMBALTA) 60 MG capsule; Take 1 capsule by mouth 2 (Two) Times a Day.  Dispense: 180 capsule; Refill: 1  -     propranolol (INDERAL) 20 MG tablet; Take 1 tablet by mouth 2 (Two) Times a Day As Needed (anxiety).  Dispense: 180 tablet; Refill: 1    3. Morbid (severe) obesity due to excess calories  Assessment & Plan:  Patient's (Body mass index is 39.59 kg/mý.) indicates that they are morbidly/severely obese (BMI > 40 or > 35 with obesity - related health condition)  with health conditions that include none . Weight is unchanged. BMI  is above average; BMI management plan is completed. Continue portion control and increasing exercise.       4. Chronic bilateral low back pain with bilateral sciatica  Assessment & Plan:  Chronic low back pain with bilateral sciatica persists despite conservative management with over-the-counter analgesics, muscle relaxers, duloxetine and prescription NSAIDs.  He has completed a 6-week course of PT with no significant improvement.  MRI L-spine has been ordered today and we will plan to get him in with Dr. [facility] once the MRI results return.    Orders:  -     MRI Lumbar Spine Without Contrast; Future  -     meloxicam (MOBIC) 15 MG tablet; Take 1 tablet by mouth Daily As Needed (joint pain). Take with food.  Dispense: 30 tablet; Refill: 5    5. Other primary thrombophilia  Overview:  S/p DVT.  Course of anticoagulation completed.  Evaluation completed by Hematology with recommendation that lifelong anticoagulation is not indicated at this time.          Follow Up   Return in about 6 months (around 2/22/2024) for Annual physical.  Patient was given instructions and counseling regarding his condition or for health maintenance advice. Please see specific information pulled into the AVS if appropriate.           05/16/2023

## 2023-08-22 NOTE — ASSESSMENT & PLAN NOTE
Doing much better on the duloxetine at 60 mg twice daily along with the propranolol 20 mg twice daily as needed for anxiety.  Mood symptoms are well controlled.  Refills sent today.  Continue to monitor.

## 2023-08-24 ENCOUNTER — TELEPHONE (OUTPATIENT)
Dept: FAMILY MEDICINE CLINIC | Age: 33
End: 2023-08-24
Payer: COMMERCIAL

## 2023-08-24 NOTE — TELEPHONE ENCOUNTER
----- Message from Kari Zavaleta MD sent at 8/22/2023  3:48 PM EDT -----  Please call pt to schedule a follow-up appt for 6 months (Annual physical).  Thanks, JOEL     Sunny Sites already talk to mother

## 2024-01-29 ENCOUNTER — PATIENT MESSAGE (OUTPATIENT)
Dept: FAMILY MEDICINE CLINIC | Age: 34
End: 2024-01-29
Payer: COMMERCIAL

## 2024-01-29 DIAGNOSIS — M54.42 CHRONIC BILATERAL LOW BACK PAIN WITH BILATERAL SCIATICA: Primary | ICD-10-CM

## 2024-01-29 DIAGNOSIS — G89.29 CHRONIC BILATERAL LOW BACK PAIN WITH BILATERAL SCIATICA: Primary | ICD-10-CM

## 2024-01-29 DIAGNOSIS — M54.41 CHRONIC BILATERAL LOW BACK PAIN WITH BILATERAL SCIATICA: Primary | ICD-10-CM

## 2024-01-30 NOTE — TELEPHONE ENCOUNTER
From: Duane A Centers Jr.  To: Kari Zavaleta  Sent: 1/29/2024 6:31 AM EST  Subject: MRI Order    Hello Dr. Zavaleta I was wondering if the original order you put in for my mri is still open and available for me to go have it done before my visit on Friday. I know I have all but ignored it and put it off as long as possible. As for my depression and anxiety suffered a pretty significant loss with the passing of my sister in December and it’s been through the roof and I feel overwhelmed. Can the propanonal be increased just until my visit Friday?   Thanks - Robel

## 2024-02-02 ENCOUNTER — OFFICE VISIT (OUTPATIENT)
Dept: FAMILY MEDICINE CLINIC | Age: 34
End: 2024-02-02
Payer: COMMERCIAL

## 2024-02-02 VITALS
OXYGEN SATURATION: 98 % | WEIGHT: 309.2 LBS | SYSTOLIC BLOOD PRESSURE: 137 MMHG | HEART RATE: 89 BPM | BODY MASS INDEX: 43.29 KG/M2 | DIASTOLIC BLOOD PRESSURE: 92 MMHG | HEIGHT: 71 IN

## 2024-02-02 DIAGNOSIS — M54.42 CHRONIC BILATERAL LOW BACK PAIN WITH BILATERAL SCIATICA: ICD-10-CM

## 2024-02-02 DIAGNOSIS — G89.29 CHRONIC BILATERAL LOW BACK PAIN WITH BILATERAL SCIATICA: ICD-10-CM

## 2024-02-02 DIAGNOSIS — M54.41 CHRONIC BILATERAL LOW BACK PAIN WITH BILATERAL SCIATICA: ICD-10-CM

## 2024-02-02 DIAGNOSIS — F33.0 MAJOR DEPRESSIVE DISORDER, RECURRENT EPISODE, MILD DEGREE: Primary | ICD-10-CM

## 2024-02-02 DIAGNOSIS — F43.21 GRIEF REACTION: ICD-10-CM

## 2024-02-02 DIAGNOSIS — D68.59 OTHER PRIMARY THROMBOPHILIA: ICD-10-CM

## 2024-02-02 DIAGNOSIS — F41.1 GENERALIZED ANXIETY DISORDER: ICD-10-CM

## 2024-02-02 PROBLEM — F43.20 GRIEF REACTION: Status: ACTIVE | Noted: 2024-02-02

## 2024-02-02 PROCEDURE — 99214 OFFICE O/P EST MOD 30 MIN: CPT | Performed by: FAMILY MEDICINE

## 2024-02-02 RX ORDER — BUPROPION HYDROCHLORIDE 150 MG/1
150 TABLET ORAL DAILY
Qty: 30 TABLET | Refills: 5 | Status: SHIPPED | OUTPATIENT
Start: 2024-02-02

## 2024-02-02 RX ORDER — TRAZODONE HYDROCHLORIDE 50 MG/1
50 TABLET ORAL NIGHTLY PRN
Qty: 30 TABLET | Refills: 5 | Status: SHIPPED | OUTPATIENT
Start: 2024-02-02

## 2024-02-02 NOTE — ASSESSMENT & PLAN NOTE
Depression has been worse since the loss of his sister unexpectedly at the age of 42 in December.  Prior to that, he actually felt the Cymbalta was doing quite well at controlling his symptoms at 60 mg twice daily.  We are can add in Wellbutrin at 150 mg daily at the present time along with trazodone 50 mg to be taken at bedtime since he is really struggling with sleep.  Continue propranolol 20 mg twice daily as needed for anxiety and panic attacks as well.  I will see him back in 4 weeks for close follow-up.  Discussed the possibility of grief counseling but he has tried this in the past and did not feel it was very helpful for him.

## 2024-02-02 NOTE — PROGRESS NOTES
"Chief Complaint  Back Pain, Knee Pain, Anxiety, and Depression    Subjective          Duane A Centers Jr. presents to [facility] today for follow-up on chronic issues.    He is on duloxetine 60 mg twice daily for depression and anxiety. He is also using propranolol 20 mg twice daily as needed for the anxiety.  Mood has been \"some days super depressed, other days feel okay.\"  His sister did pass away in December, so that has been very difficult for him.  They were very close.  Poor energy.  Poor motivation.  No emotional lability.  +Depressed mood.  Loss of appetite.  Poor sleep, 4 hours per night.  No guilt or worthlessness.  No SI.  Previously was on Lexapro (plateaued).       He has been on meloxicam for low back pain but this gave incomplete relief so he has discontinued it.  He has tried Tylenol, OTC NSAIDs, and muscle relaxers.  He has completed 6 weeks of PT. we had discussed getting an MRI of the L-spine back in August, but at that time, he was unable to complete it, so the order was closed.  I did reopen that order again  earlier this week after he wrote in on [employer] requesting to get it done.  That is scheduled for Thursday next week.           Current Outpatient Medications:     DULoxetine (CYMBALTA) 60 MG capsule, Take 1 capsule by mouth 2 (Two) Times a Day., Disp: 180 capsule, Rfl: 1    propranolol (INDERAL) 20 MG tablet, Take 1 tablet by mouth 2 (Two) Times a Day As Needed (anxiety)., Disp: 180 tablet, Rfl: 1    buPROPion XL (WELLBUTRIN XL) 150 MG 24 hr tablet, Take 1 tablet by mouth Daily., Disp: 30 tablet, Rfl: 5    traZODone (DESYREL) 50 MG tablet, Take 1 tablet by mouth At Night As Needed for Sleep., Disp: 30 tablet, Rfl: 5  Medications Discontinued During This Encounter   Medication Reason    meloxicam (MOBIC) 15 MG tablet Not Efficacious         Allergies:  Patient has no known allergies.      Objective   Vital Signs:   Vitals:    02/02/24 1412   BP: 137/92   BP " "Location: Right arm   Patient Position: Sitting   Cuff Size: Large Adult   Pulse: 89   SpO2: 98%   Weight: (!) 140 kg (309 lb 3.2 oz)   Height: 179.1 cm (70.51\")         Physical Exam  Vitals reviewed.   Constitutional:       General: He is not in acute distress.     Appearance: Normal appearance. He is well-developed.   HENT:      Head: Normocephalic and atraumatic.      Right Ear: External ear normal.      Left Ear: External ear normal.   Eyes:      Extraocular Movements: Extraocular movements intact.      Conjunctiva/sclera: Conjunctivae normal.      Pupils: Pupils are equal, round, and reactive to light.   Cardiovascular:      Rate and Rhythm: Normal rate and regular rhythm.      Heart sounds: No murmur heard.  Pulmonary:      Effort: Pulmonary effort is normal.      Breath sounds: Normal breath sounds. No wheezing, rhonchi or rales.   Abdominal:      General: Bowel sounds are normal. There is no distension.      Palpations: Abdomen is soft.      Tenderness: There is no abdominal tenderness.   Musculoskeletal:         General: Normal range of motion.   Neurological:      Mental Status: He is alert.   Psychiatric:         Mood and Affect: Affect normal.           Lab Results   Component Value Date    GLUCOSE 85 08/19/2022    BUN 9 08/19/2022    CREATININE 0.94 08/19/2022    BCR 9.6 08/19/2022    K 4.5 08/19/2022    CO2 25.2 08/19/2022    CALCIUM 9.7 08/19/2022    ALBUMIN 4.60 08/19/2022    LABIL2 1.4 07/24/2019    AST 22 08/19/2022    ALT 26 08/19/2022       Lab Results   Component Value Date    CHOL 190 08/19/2022    TRIG 83 08/19/2022    HDL 42 08/19/2022     (H) 08/19/2022       Lab Results   Component Value Date    WBC 10.00 07/24/2019    HGB 13.8 07/24/2019    HCT 40.6 (L) 07/24/2019    MCV 85.3 07/24/2019     07/24/2019            Assessment and Plan    Diagnoses and all orders for this visit:    1. Major depressive disorder, recurrent episode, mild degree (Primary)  Assessment & " Plan:  Depression has been worse since the loss of his sister unexpectedly at the age of 42 in December.  Prior to that, he actually felt the Cymbalta was doing quite well at controlling his symptoms at 60 mg twice daily.  We are can add in Wellbutrin at 150 mg daily at the present time along with trazodone 50 mg to be taken at bedtime since he is really struggling with sleep.  Continue propranolol 20 mg twice daily as needed for anxiety and panic attacks as well.  I will see him back in 4 weeks for close follow-up.  Discussed the possibility of grief counseling but he has tried this in the past and did not feel it was very helpful for him.    Orders:  -     traZODone (DESYREL) 50 MG tablet; Take 1 tablet by mouth At Night As Needed for Sleep.  Dispense: 30 tablet; Refill: 5  -     buPROPion XL (WELLBUTRIN XL) 150 MG 24 hr tablet; Take 1 tablet by mouth Daily.  Dispense: 30 tablet; Refill: 5    2. Grief reaction  Assessment & Plan:  As per depression plan.      3. Generalized anxiety disorder  Assessment & Plan:  As per depression plan.      4. Chronic bilateral low back pain with bilateral sciatica  Assessment & Plan:  He has been on meloxicam for low back pain but this gave incomplete relief so he has discontinued it.  He has tried Tylenol, OTC NSAIDs, and muscle relaxers.  He has completed 6 weeks of PT. we had discussed getting an MRI of the L-spine back in August, but at that time, he was unable to complete it, so the order was closed.  I did reopen that order again earlier this week after he wrote in on [employer] requesting to get it done.  That is scheduled for Thursday next week.   Will plan to proceed with referral to Pain Management at [facility] once that returns.      5. Other primary thrombophilia  Overview:  S/p DVT.  Course of anticoagulation completed.  Evaluation completed by Hematology with recommendation that lifelong anticoagulation is not indicated at this time.          Duane A Centers Jr.  reports  that he has never smoked. His smokeless tobacco use includes chew.. I have educated him on the risk of diseases from using tobacco products such as cancer, COPD, and heart disease.     I advised him to quit and he is not willing to quit.    I spent  1  minutes counseling the patient.            Follow Up   Return in about 4 weeks (around 3/1/2024) for Recheck **ok to use same day**.  Patient was given instructions and counseling regarding his condition or for health maintenance advice. Please see specific information pulled into the AVS if appropriate.           02/02/2024

## 2024-02-02 NOTE — ASSESSMENT & PLAN NOTE
He has been on meloxicam for low back pain but this gave incomplete relief so he has discontinued it.  He has tried Tylenol, OTC NSAIDs, and muscle relaxers.  He has completed 6 weeks of PT. we had discussed getting an MRI of the L-spine back in August, but at that time, he was unable to complete it, so the order was closed.  I did reopen that order again earlier this week after he wrote in on [employer] requesting to get it done.  That is scheduled for Thursday next week.   Will plan to proceed with referral to Pain Management at [facility] once that returns.

## 2024-02-20 ENCOUNTER — HOSPITAL ENCOUNTER (OUTPATIENT)
Dept: MRI IMAGING | Facility: HOSPITAL | Age: 34
Discharge: HOME | End: 2024-02-20
Admitting: FAMILY MEDICINE
Payer: COMMERCIAL

## 2024-02-20 DIAGNOSIS — G89.29 CHRONIC BILATERAL LOW BACK PAIN WITH BILATERAL SCIATICA: ICD-10-CM

## 2024-02-20 DIAGNOSIS — M54.42 CHRONIC BILATERAL LOW BACK PAIN WITH BILATERAL SCIATICA: ICD-10-CM

## 2024-02-20 DIAGNOSIS — M54.41 CHRONIC BILATERAL LOW BACK PAIN WITH BILATERAL SCIATICA: ICD-10-CM

## 2024-02-20 PROCEDURE — 72148 MRI LUMBAR SPINE W/O DYE: CPT

## 2024-05-04 DIAGNOSIS — F33.0 MAJOR DEPRESSIVE DISORDER, RECURRENT EPISODE, MILD DEGREE: ICD-10-CM

## 2024-05-04 DIAGNOSIS — F41.1 GENERALIZED ANXIETY DISORDER: ICD-10-CM

## 2024-05-06 RX ORDER — DULOXETIN HYDROCHLORIDE 60 MG/1
60 CAPSULE, DELAYED RELEASE ORAL 2 TIMES DAILY
Qty: 180 CAPSULE | Refills: 1 | Status: SHIPPED | OUTPATIENT
Start: 2024-05-06

## 2024-05-06 RX ORDER — PROPRANOLOL HYDROCHLORIDE 20 MG/1
20 TABLET ORAL 2 TIMES DAILY PRN
Qty: 180 TABLET | Refills: 1 | Status: SHIPPED | OUTPATIENT
Start: 2024-05-06

## 2024-07-24 ENCOUNTER — PATIENT MESSAGE (OUTPATIENT)
Dept: FAMILY MEDICINE CLINIC | Age: 34
End: 2024-07-24
Payer: COMMERCIAL

## 2024-07-24 DIAGNOSIS — M54.41 CHRONIC BILATERAL LOW BACK PAIN WITH BILATERAL SCIATICA: Primary | ICD-10-CM

## 2024-07-24 DIAGNOSIS — G89.29 CHRONIC BILATERAL LOW BACK PAIN WITH BILATERAL SCIATICA: Primary | ICD-10-CM

## 2024-07-24 DIAGNOSIS — M54.42 CHRONIC BILATERAL LOW BACK PAIN WITH BILATERAL SCIATICA: Primary | ICD-10-CM

## 2024-07-25 NOTE — TELEPHONE ENCOUNTER
From: Duane A Centers Jr.  To: Kari Zavaleta  Sent: 7/24/2024 6:02 PM EDT  Subject: Back pain    Hey there , any chance I could get a referral to [facility]? I’m beginning to experience more profound numbness and tingling. Mostly on my left leg and foot.

## 2024-08-02 ENCOUNTER — OFFICE VISIT (OUTPATIENT)
Dept: FAMILY MEDICINE CLINIC | Age: 34
End: 2024-08-02
Payer: COMMERCIAL

## 2024-08-02 VITALS
HEIGHT: 71 IN | DIASTOLIC BLOOD PRESSURE: 89 MMHG | OXYGEN SATURATION: 97 % | WEIGHT: 315 LBS | HEART RATE: 106 BPM | BODY MASS INDEX: 44.1 KG/M2 | SYSTOLIC BLOOD PRESSURE: 147 MMHG

## 2024-08-02 DIAGNOSIS — F33.0 MAJOR DEPRESSIVE DISORDER, RECURRENT EPISODE, MILD DEGREE: ICD-10-CM

## 2024-08-02 DIAGNOSIS — M54.42 CHRONIC BILATERAL LOW BACK PAIN WITH BILATERAL SCIATICA: ICD-10-CM

## 2024-08-02 DIAGNOSIS — M54.41 CHRONIC BILATERAL LOW BACK PAIN WITH BILATERAL SCIATICA: ICD-10-CM

## 2024-08-02 DIAGNOSIS — D68.59 OTHER PRIMARY THROMBOPHILIA: ICD-10-CM

## 2024-08-02 DIAGNOSIS — F41.1 GENERALIZED ANXIETY DISORDER: Primary | ICD-10-CM

## 2024-08-02 DIAGNOSIS — G89.29 CHRONIC BILATERAL LOW BACK PAIN WITH BILATERAL SCIATICA: ICD-10-CM

## 2024-08-02 DIAGNOSIS — E66.01 CLASS 3 SEVERE OBESITY DUE TO EXCESS CALORIES WITHOUT SERIOUS COMORBIDITY WITH BODY MASS INDEX (BMI) OF 45.0 TO 49.9 IN ADULT: ICD-10-CM

## 2024-08-02 PROCEDURE — 99214 OFFICE O/P EST MOD 30 MIN: CPT | Performed by: FAMILY MEDICINE

## 2024-08-02 RX ORDER — HYDROXYZINE HYDROCHLORIDE 10 MG/1
10 TABLET, FILM COATED ORAL 3 TIMES DAILY PRN
Qty: 90 TABLET | Refills: 0 | Status: SHIPPED | OUTPATIENT
Start: 2024-08-02

## 2024-08-02 RX ORDER — BUPROPION HYDROCHLORIDE 300 MG/1
300 TABLET ORAL DAILY
Qty: 30 TABLET | Refills: 5 | Status: SHIPPED | OUTPATIENT
Start: 2024-08-02

## 2024-08-02 NOTE — PROGRESS NOTES
"Chief Complaint  Depression    Subjective          Duane A Centers Jr. presents to [facility] today for routine f/u of chronic issues.     He is on duloxetine and prn propranolol for depression and anxiety.  Mood has been \"constant fight or flight, on edge.\"  His depression has been worse since his sister passed away in December.  They were very close.   At his last visit in February, we did add in Wellbutrin 150 mg daily and trazodone 50 mg nightly to help with worsening mood and insomnia.  Today, he feels that his mood is \"okay\" but the anxiety continues to plague him . He has previously tried buspirone (ineffective).       He has a longstanding history of chronic low back pain.  He has previously tried meloxicam (ineffective), Tylenol, OTC NSAIDs, and muscle relaxers.  He completed a course of PT with no relief.  We therefore obtained MRI L-spine that showed a circumferential disc bulge with small central annular tear and central protrusion at L5-S1 as well as facet arthritis of the L4-L5 facet joints.  Referral was placed to [facility] where he saw Dr. Mckeon on 3/5/2024.  They discussed possibly doing medial branch block and possible RFA for facet joint mediated pain.  He does have an appointment with [facility] on 8/6/2024 to discuss other options.         Current Outpatient Medications:     buPROPion XL (WELLBUTRIN XL) 300 MG 24 hr tablet, Take 1 tablet by mouth Daily., Disp: 30 tablet, Rfl: 5    DULoxetine (CYMBALTA) 60 MG capsule, Take 1 capsule by mouth 2 (Two) Times a Day., Disp: 180 capsule, Rfl: 1    propranolol (INDERAL) 20 MG tablet, Take 1 tablet by mouth 2 (Two) Times a Day As Needed (anxiety)., Disp: 180 tablet, Rfl: 1    traZODone (DESYREL) 50 MG tablet, Take 1 tablet by mouth At Night As Needed for Sleep., Disp: 30 tablet, Rfl: 5    hydrOXYzine (ATARAX) 10 MG tablet, Take 1 tablet by mouth 3 (Three) Times a Day As Needed for Anxiety., " "Disp: 90 tablet, Rfl: 0  Medications Discontinued During This Encounter   Medication Reason    buPROPion XL (WELLBUTRIN XL) 150 MG 24 hr tablet Reorder           Allergies:  Patient has no known allergies.      Objective   Vital Signs:   Vitals:    08/02/24 1414 08/02/24 1433   BP: 155/97 147/89   BP Location: Left arm    Patient Position: Sitting    Cuff Size: Large Adult    Pulse: 119 106   SpO2: 97%    Weight: (!) 149 kg (328 lb)    Height: 179.1 cm (70.51\")          Physical Exam  Vitals reviewed.   Constitutional:       General: He is not in acute distress.     Appearance: Normal appearance. He is well-developed.   HENT:      Head: Normocephalic and atraumatic.      Right Ear: External ear normal.      Left Ear: External ear normal.   Eyes:      Extraocular Movements: Extraocular movements intact.      Conjunctiva/sclera: Conjunctivae normal.      Pupils: Pupils are equal, round, and reactive to light.   Cardiovascular:      Rate and Rhythm: Normal rate and regular rhythm.      Heart sounds: No murmur heard.  Pulmonary:      Effort: Pulmonary effort is normal.      Breath sounds: Normal breath sounds. No wheezing, rhonchi or rales.   Abdominal:      General: Bowel sounds are normal. There is no distension.      Palpations: Abdomen is soft.      Tenderness: There is no abdominal tenderness.   Musculoskeletal:         General: Normal range of motion.   Neurological:      Mental Status: He is alert.   Psychiatric:         Mood and Affect: Affect normal.           Lab Results   Component Value Date    GLUCOSE 85 08/19/2022    BUN 9 08/19/2022    CREATININE 0.94 08/19/2022    BCR 9.6 08/19/2022    K 4.5 08/19/2022    CO2 25.2 08/19/2022    CALCIUM 9.7 08/19/2022    ALBUMIN 4.60 08/19/2022    LABIL2 1.4 07/24/2019    AST 22 08/19/2022    ALT 26 08/19/2022       Lab Results   Component Value Date    CHOL 190 08/19/2022    TRIG 83 08/19/2022    HDL 42 08/19/2022     (H) 08/19/2022       Lab Results   Component " Value Date    WBC 10.00 07/24/2019    HGB 13.8 07/24/2019    HCT 40.6 (L) 07/24/2019    MCV 85.3 07/24/2019     07/24/2019            Assessment and Plan    Assessment & Plan  Generalized anxiety disorder   depression has been fairly well-controlled but anxiety continues to really be bothering him.  Increasing Wellbutrin from 150 mg daily to 300 mg daily.  Continue duloxetine 60 mg twice daily and propranolol 20 mg twice daily as needed.  We are also going to add in hydroxyzine 10 mg 3 times daily as needed for anxiety.  I will see him back in 3 months or sooner as needed for follow-up.  Major depressive disorder, recurrent episode, mild degree   As per anxiety plan.  Chronic bilateral low back pain with bilateral sciatica  He has an appointment with [facility] on 8/6/2024.  Previously was evaluated by Dr. Nimisha Jaramillo at [facility].  He is going to weigh his options after he discusses with Collette and let me know if there is anything I can do to help.  Other primary thrombophilia  S/p DVT.  Course of anticoagulation completed.  Evaluation completed by Hematology with recommendation that lifelong anticoagulation is not indicated at this time.  Class 3 severe obesity due to excess calories without serious comorbidity with body mass index (BMI) of 45.0 to 49.9 in adult  Patient's (Body mass index is 46.38 kg/m².) indicates that they are  with health conditions that include  . Weight is . BMI  . We discussed .      New Medications Ordered This Visit   Medications    buPROPion XL (WELLBUTRIN XL) 300 MG 24 hr tablet     Sig: Take 1 tablet by mouth Daily.     Dispense:  30 tablet     Refill:  5    hydrOXYzine (ATARAX) 10 MG tablet     Sig: Take 1 tablet by mouth 3 (Three) Times a Day As Needed for Anxiety.     Dispense:  90 tablet     Refill:  0             Follow Up   Return in about 3 months (around 11/2/2024) for Annual physical.  Patient was given instructions and counseling  regarding his condition or for health maintenance advice. Please see specific information pulled into the AVS if appropriate.           02/02/2024

## 2024-08-02 NOTE — ASSESSMENT & PLAN NOTE
He has an appointment with [facility] on 8/6/2024.  Previously was evaluated by Dr. Nimisha Jaramillo at [facility].  He is going to weigh his options after he discusses with Collette and let me know if there is anything I can do to help.

## 2024-08-02 NOTE — ASSESSMENT & PLAN NOTE
depression has been fairly well-controlled but anxiety continues to really be bothering him.  Increasing Wellbutrin from 150 mg daily to 300 mg daily.  Continue duloxetine 60 mg twice daily and propranolol 20 mg twice daily as needed.  We are also going to add in hydroxyzine 10 mg 3 times daily as needed for anxiety.  I will see him back in 3 months or sooner as needed for follow-up.

## 2024-08-02 NOTE — ASSESSMENT & PLAN NOTE
S/p DVT.  Course of anticoagulation completed.  Evaluation completed by Hematology with recommendation that lifelong anticoagulation is not indicated at this time.

## 2024-08-09 ENCOUNTER — PATIENT MESSAGE (OUTPATIENT)
Dept: FAMILY MEDICINE CLINIC | Age: 34
End: 2024-08-09
Payer: COMMERCIAL

## 2024-08-09 NOTE — TELEPHONE ENCOUNTER
From: Duane A Centers Jr.  To: Kari Zavaleta  Sent: 8/9/2024 6:25 AM EDT  Subject: Back pain    Hey Dr. Zavaleta is there anything you can prescribe me for pain? I’ve been off work all week and it’s getting to the point it’s unbearable. Anything will help or any suggestions you have would be great thank you

## 2024-08-28 ENCOUNTER — PATIENT MESSAGE (OUTPATIENT)
Dept: FAMILY MEDICINE CLINIC | Age: 34
End: 2024-08-28
Payer: COMMERCIAL

## 2024-08-29 NOTE — TELEPHONE ENCOUNTER
From: Duane A Centers Jr.  To: Kari Zavaleta  Sent: 8/28/2024 12:58 PM EDT  Subject: Back pain and restrictions?    Hey  just wanted to get your opinion on whether I should be maybe on a restricted duty at work or if I should even be attempting it all together. Since you know my history with my back I thought I’d reach out to you as well as Dr. Sosa from [facility].

## 2024-08-29 NOTE — TELEPHONE ENCOUNTER
OK to approve 4 weeks continuous short-term disability due to chronic bilateral low back pain with bilateral sciatica.  Pt has been informed to send paperwork to us.  Thanks, BZBRADLY

## 2024-09-03 ENCOUNTER — TELEPHONE (OUTPATIENT)
Dept: FAMILY MEDICINE CLINIC | Age: 34
End: 2024-09-03
Payer: COMMERCIAL

## 2024-09-03 NOTE — TELEPHONE ENCOUNTER
Pt was called to collect the fee for this short term disability paperwork, VM was left. Paperwork was placed in the incomplete box.

## 2024-09-04 NOTE — TELEPHONE ENCOUNTER
Kari Zavaleta MD  8/29/24  3:42 PM  Note      OK to approve 4 weeks continuous short-term disability due to chronic bilateral low back pain with bilateral sciatica.  Pt has been informed to send paperwork to us.  Thanks, BZM        '  Placed on PCP desk to sign and answer 1 question highlighted with arrow sticky tab

## 2024-09-20 ENCOUNTER — TELEPHONE (OUTPATIENT)
Dept: FAMILY MEDICINE CLINIC | Age: 34
End: 2024-09-20
Payer: COMMERCIAL

## 2025-01-15 ENCOUNTER — TELEMEDICINE (OUTPATIENT)
Dept: FAMILY MEDICINE CLINIC | Facility: TELEHEALTH | Age: 35
End: 2025-01-15
Payer: COMMERCIAL

## 2025-01-15 DIAGNOSIS — F33.0 MAJOR DEPRESSIVE DISORDER, RECURRENT EPISODE, MILD DEGREE: ICD-10-CM

## 2025-01-15 DIAGNOSIS — F41.1 GENERALIZED ANXIETY DISORDER: ICD-10-CM

## 2025-01-15 DIAGNOSIS — J01.00 ACUTE NON-RECURRENT MAXILLARY SINUSITIS: Primary | ICD-10-CM

## 2025-01-15 PROCEDURE — 99213 OFFICE O/P EST LOW 20 MIN: CPT | Performed by: NURSE PRACTITIONER

## 2025-01-15 RX ORDER — DULOXETIN HYDROCHLORIDE 60 MG/1
60 CAPSULE, DELAYED RELEASE ORAL 2 TIMES DAILY
Qty: 180 CAPSULE | Refills: 1 | Status: SHIPPED | OUTPATIENT
Start: 2025-01-15

## 2025-01-15 RX ORDER — PREDNISONE 10 MG/1
TABLET ORAL
Qty: 30 TABLET | Refills: 0 | Status: SHIPPED | OUTPATIENT
Start: 2025-01-15

## 2025-01-15 NOTE — LETTER
[facility]  [facility]  [address]   [address]  [place] [address]  Phone: [phone]  Fax: [phone]    Duane Centers was seen and treated in our Urgent Care on 1/15/2025.  He may return to work on 1/16/25      .        Thank you for choosing [facility].      JAGDISH Lucas

## 2025-01-15 NOTE — PROGRESS NOTES
"You have chosen to receive care through a telehealth visit.  Do you consent to use a video/audio connection for your medical care today? Yes     CHIEF COMPLAINT  Chief Complaint   Patient presents with    Sinusitis         HPI  Duane A Centers Jr. is a 34 y.o. male  presents with complaint of sinus pain and pressure with green nasal discharge that started 5 days ago    Review of Systems   HENT:  Positive for congestion, sinus pressure and sinus pain.    Respiratory:  Positive for cough.    All other systems reviewed and are negative.      Past Medical History:   Diagnosis Date    Anxiety     Depression     Pulmonary embolism 2020    \"hereditary\"       History reviewed. No pertinent family history.    Social History     Socioeconomic History    Marital status: Single   Tobacco Use    Smoking status: Never    Smokeless tobacco: Current     Types: Chew   Vaping Use    Vaping status: Never Used   Substance and Sexual Activity    Alcohol use: Not Currently    Drug use: Never       Duane A Centers Jr.  reports that he has never smoked. His smokeless tobacco use includes chew.     There were no vitals taken for this visit.    PHYSICAL EXAM  Physical Exam   Constitutional: He appears well-developed and well-nourished.   HENT:   Head: Normocephalic.   Eyes: Pupils are equal, round, and reactive to light.   Pulmonary/Chest: Effort normal.   Musculoskeletal: Normal range of motion.   Neurological: He is alert.   Psychiatric: He has a normal mood and affect.       Results for orders placed or performed in visit on 10/31/22   POCT SARS-CoV-2 Antigen CR + Flu    Collection Time: 10/31/22  3:58 PM    Specimen: Swab   Result Value Ref Range    SARS Antigen Not Detected Not Detected, Presumptive Negative    Influenza A Antigen CR Not Detected Not Detected    Influenza B Antigen CR Not Detected Not Detected    Internal Control Passed Passed    Lot Number [id number]     Expiration Date 8/2/23    COVID-19,APTIMA LU(DANA),[facility] " JOHN, NP/OP SWAB IN UTM/VTM/[facility] [employer]SPORT MEDIA,24 HR TAT - Swab, Nasopharynx    Collection Time: 10/31/22  4:12 PM    Specimen: Nasopharynx; Swab   Result Value Ref Range    COVID19 Not Detected Not Detected - Ref. Range       Diagnoses and all orders for this visit:    1. Acute non-recurrent maxillary sinusitis (Primary)  -     amoxicillin-clavulanate (AUGMENTIN) 875-125 MG per tablet; Take 1 tablet by mouth 2 (Two) Times a Day for 10 days.  Dispense: 20 tablet; Refill: 0  -     predniSONE (DELTASONE) 10 MG tablet; 5 po for 2 days, 4 po for 2 days, 3 po for 2 days, 2 po for 2 days, 1 po for 2 days  Dispense: 30 tablet; Refill: 0          FOLLOW-UP  As discussed during visit with PCP/[facility] if no improvement or Urgent Care/Emergency Department if worsening of symptoms    Patient verbalizes understanding of medication dosage, comfort measures, instructions for treatment and follow-up.    JAGDISH Fischer  01/15/2025  06:38 EST    Mode of Visit: Video  Location of patient: -HOME-  Location of provider: +HOME+  You have chosen to receive care through a telehealth visit.  The patient has signed the video visit consent form.  The visit included audio and video interaction. No technical issues occurred during this visit.      The use of a video visit has been reviewed with the patient and verbal informed consent has been obtained. Myself and Duane A Centers Jr. participated in this visit. The patient is located in [address] [place] [address].   I am located in [place], [place]. [employer] and [employer] were utilized. I spent 10 minutes in the patient's chart for this visit.         Note Disclaimer: At [facility], we believe that sharing information builds trust and better   relationships. You are receiving this note because you recently visited [facility]. It is possible you   will see health information before a provider has talked with you about it. This kind of information can   be easy to  misunderstand. To help you fully understand what it means for your health, we urge you to   discuss this note with your provider.

## 2025-01-22 ENCOUNTER — OFFICE VISIT (OUTPATIENT)
Dept: FAMILY MEDICINE CLINIC | Age: 35
End: 2025-01-22
Payer: COMMERCIAL

## 2025-01-22 VITALS
BODY MASS INDEX: 44.1 KG/M2 | WEIGHT: 315 LBS | HEIGHT: 71 IN | TEMPERATURE: 99 F | DIASTOLIC BLOOD PRESSURE: 85 MMHG | OXYGEN SATURATION: 95 % | SYSTOLIC BLOOD PRESSURE: 136 MMHG | HEART RATE: 94 BPM

## 2025-01-22 DIAGNOSIS — F41.1 GENERALIZED ANXIETY DISORDER: ICD-10-CM

## 2025-01-22 DIAGNOSIS — F33.0 MAJOR DEPRESSIVE DISORDER, RECURRENT EPISODE, MILD DEGREE: ICD-10-CM

## 2025-01-22 DIAGNOSIS — D68.59 OTHER PRIMARY THROMBOPHILIA: ICD-10-CM

## 2025-01-22 DIAGNOSIS — E66.813 CLASS 3 SEVERE OBESITY DUE TO EXCESS CALORIES WITHOUT SERIOUS COMORBIDITY WITH BODY MASS INDEX (BMI) OF 45.0 TO 49.9 IN ADULT: ICD-10-CM

## 2025-01-22 DIAGNOSIS — M54.41 CHRONIC BILATERAL LOW BACK PAIN WITH BILATERAL SCIATICA: Primary | ICD-10-CM

## 2025-01-22 DIAGNOSIS — M54.42 CHRONIC BILATERAL LOW BACK PAIN WITH BILATERAL SCIATICA: Primary | ICD-10-CM

## 2025-01-22 DIAGNOSIS — G89.29 CHRONIC BILATERAL LOW BACK PAIN WITH BILATERAL SCIATICA: Primary | ICD-10-CM

## 2025-01-22 DIAGNOSIS — E66.01 CLASS 3 SEVERE OBESITY DUE TO EXCESS CALORIES WITHOUT SERIOUS COMORBIDITY WITH BODY MASS INDEX (BMI) OF 45.0 TO 49.9 IN ADULT: ICD-10-CM

## 2025-01-22 PROBLEM — F43.21 GRIEF REACTION: Status: RESOLVED | Noted: 2024-02-02 | Resolved: 2025-01-22

## 2025-01-22 PROBLEM — F43.20 GRIEF REACTION: Status: RESOLVED | Noted: 2024-02-02 | Resolved: 2025-01-22

## 2025-01-22 PROCEDURE — 99214 OFFICE O/P EST MOD 30 MIN: CPT | Performed by: FAMILY MEDICINE

## 2025-01-22 RX ORDER — BUPROPION HYDROCHLORIDE 300 MG/1
300 TABLET ORAL DAILY
Qty: 90 TABLET | Refills: 1 | Status: SHIPPED | OUTPATIENT
Start: 2025-01-22

## 2025-01-22 NOTE — ASSESSMENT & PLAN NOTE
Stable on bupropion 300 mg daily and duloxetine 60 mg twice daily.  Refills were needed today.  Continue to monitor.  Orders:    buPROPion XL (WELLBUTRIN XL) 300 MG 24 hr tablet; Take 1 tablet by mouth Daily.

## 2025-01-22 NOTE — PROGRESS NOTES
"Chief Complaint  Back Pain    Subjective          Duane A Centers Jr. presents to [facility] today for chronic low back pain.    He is currently on duloxetine for chronic low back pain with bilateral radiculopathy.  He was evaluated by [facility] in early August, at which time it was recommended that he return to Pain Management for epidural injection.  He has been following with [facility] for some time and completing epidurals there.  He has had 3 so far, each of which gave him no more than 4 days relief.  He has previously been on baclofen and Celebrex.  Previously, he has follow-up with Dr. Nimisha Jaramillo at [facility].    He is on bupropion and duloxetine for depression and anxiety.  Needs refills today.  Mood has been \"good.\"      Current Outpatient Medications:     amoxicillin-clavulanate (AUGMENTIN) 875-125 MG per tablet, Take 1 tablet by mouth 2 (Two) Times a Day for 10 days., Disp: 20 tablet, Rfl: 0    buPROPion XL (WELLBUTRIN XL) 300 MG 24 hr tablet, Take 1 tablet by mouth Daily., Disp: 90 tablet, Rfl: 1    DULoxetine (CYMBALTA) 60 MG capsule, TAKE 1 CAPSULE BY MOUTH TWICE DAILY, Disp: 180 capsule, Rfl: 1  Medications Discontinued During This Encounter   Medication Reason    predniSONE (DELTASONE) 10 MG tablet *Therapy completed    buPROPion XL (WELLBUTRIN XL) 300 MG 24 hr tablet Reorder         Allergies:  Patient has no known allergies.      Objective   Vital Signs:   Vitals:    01/22/25 1409 01/22/25 1434   BP: 145/97 136/85   BP Location: Right arm    Patient Position: Sitting    Cuff Size: Large Adult    Pulse: 94    Temp: 99 °F (37.2 °C)    TempSrc: Oral    SpO2: 95%    Weight: (!) 157 kg (346 lb 3.2 oz)    Height: 179.1 cm (70.51\")      Body mass index is 48.96 kg/m².           Physical Exam  Vitals reviewed.   Constitutional:       General: He is not in acute distress.     Appearance: Normal appearance. He is well-developed. "   HENT:      Head: Normocephalic and atraumatic.      Right Ear: External ear normal.      Left Ear: External ear normal.   Eyes:      Extraocular Movements: Extraocular movements intact.      Conjunctiva/sclera: Conjunctivae normal.      Pupils: Pupils are equal, round, and reactive to light.   Cardiovascular:      Rate and Rhythm: Normal rate and regular rhythm.      Heart sounds: No murmur heard.  Pulmonary:      Effort: Pulmonary effort is normal.      Breath sounds: Normal breath sounds. No wheezing, rhonchi or rales.   Abdominal:      General: Bowel sounds are normal. There is no distension.      Palpations: Abdomen is soft.      Tenderness: There is no abdominal tenderness.   Musculoskeletal:         General: Normal range of motion.   Neurological:      Mental Status: He is alert.   Psychiatric:         Mood and Affect: Affect normal.           Lab Results   Component Value Date    GLUCOSE 85 08/19/2022    BUN 9 08/19/2022    CREATININE 0.94 08/19/2022    BCR 9.6 08/19/2022    K 4.5 08/19/2022    CO2 25.2 08/19/2022    CALCIUM 9.7 08/19/2022    ALBUMIN 4.60 08/19/2022    LABIL2 1.4 07/24/2019    AST 22 08/19/2022    ALT 26 08/19/2022       Lab Results   Component Value Date    CHOL 190 08/19/2022    TRIG 83 08/19/2022    HDL 42 08/19/2022     (H) 08/19/2022       Lab Results   Component Value Date    WBC 10.00 07/24/2019    HGB 13.8 07/24/2019    HCT 40.6 (L) 07/24/2019    MCV 85.3 07/24/2019     07/24/2019            Assessment and Plan    Assessment & Plan  Chronic bilateral low back pain with bilateral sciatica  Discussed options.  He has an upcoming appointment with his pain specialist in February.  He is going to keep that appointment and discuss options with Pain Management.  If he feels like he would like a second opinion after that time, he will let me know and I will send in a referral to [facility] in [place].       Major depressive disorder, recurrent episode, mild  degree    Stable on bupropion 300 mg daily and duloxetine 60 mg twice daily.  Refills were needed today.  Continue to monitor.  Orders:    buPROPion XL (WELLBUTRIN XL) 300 MG 24 hr tablet; Take 1 tablet by mouth Daily.    Generalized anxiety disorder    As per depression plan.       Class 3 severe obesity due to excess calories without serious comorbidity with body mass index (BMI) of 45.0 to 49.9 in adult  Patient's (Body mass index is 48.96 kg/m².) indicates that they are morbidly/severely obese (BMI > 40 or > 35 with obesity - related health condition) with health conditions that include none . Weight is worsening. BMI  is above average; BMI management plan is completed.  Work on portion control and increasing exercise.          Other primary thrombophilia  S/p DVT. Course of anticoagulation completed. Evaluation completed by Hematology with recommendation that lifelong anticoagulation is not indicated at this time.                  Follow Up   Return in about 6 months (around 7/22/2025) for Annual physical.  Patient was given instructions and counseling regarding his condition or for health maintenance advice. Please see specific information pulled into the AVS if appropriate.           01/22/2025

## 2025-01-22 NOTE — ASSESSMENT & PLAN NOTE
Discussed options.  He has an upcoming appointment with his pain specialist in February.  He is going to keep that appointment and discuss options with Pain Management.  If he feels like he would like a second opinion after that time, he will let me know and I will send in a referral to [facility] in [place].

## 2025-01-30 ENCOUNTER — TELEMEDICINE (OUTPATIENT)
Dept: FAMILY MEDICINE CLINIC | Facility: TELEHEALTH | Age: 35
End: 2025-01-30
Payer: COMMERCIAL

## 2025-01-30 DIAGNOSIS — M54.41 CHRONIC BILATERAL LOW BACK PAIN WITH BILATERAL SCIATICA: Primary | ICD-10-CM

## 2025-01-30 DIAGNOSIS — G89.29 CHRONIC BILATERAL LOW BACK PAIN WITH BILATERAL SCIATICA: Primary | ICD-10-CM

## 2025-01-30 DIAGNOSIS — M54.42 CHRONIC BILATERAL LOW BACK PAIN WITH BILATERAL SCIATICA: Primary | ICD-10-CM

## 2025-01-30 RX ORDER — TIZANIDINE HYDROCHLORIDE 2 MG/1
2 CAPSULE, GELATIN COATED ORAL 3 TIMES DAILY PRN
Qty: 21 CAPSULE | Refills: 0 | Status: SHIPPED | OUTPATIENT
Start: 2025-01-30 | End: 2025-02-06

## 2025-01-30 NOTE — PROGRESS NOTES
"You have chosen to receive care through a telehealth visit.  Do you consent to use a video/audio connection for your medical care today? Yes     CHIEF COMPLAINT  No chief complaint on file.        HPI  Duane A Centers Jr. is a 34 y.o. male  presents with complaint of low back pain. Reports he had a MRI back in Aug 24 that showed L5 S1 problems. Reports he has had back problems for about 2 years. Had epidural, PT and antiinflammatories that have worked alittle. Reports this was not a work related injury. Reports no fever or chills. No nausea or vomiting. Reports burning pain like sciatic nerve pain.  Reports both sides. Reports the pain is a 7. Resting has helped. He has been using ice, IBU, Tylenol for his symptoms. Denies any loss of bowel or bladder. Denies any saddle anesthesia.     Review of Systems   Constitutional:  Negative for chills, fatigue and fever.   HENT:  Negative for congestion, ear discharge, ear pain, sinus pressure, sinus pain and sore throat.    Respiratory:  Negative for cough, chest tightness, shortness of breath and wheezing.    Cardiovascular:  Negative for chest pain.   Gastrointestinal:  Negative for abdominal pain, diarrhea, nausea and vomiting.   Musculoskeletal:  Positive for back pain. Negative for myalgias.   Neurological:  Negative for dizziness and headaches.   Psychiatric/Behavioral: Negative.         Past Medical History:   Diagnosis Date    Anxiety     Depression     Pulmonary embolism 2020    \"hereditary\"       History reviewed. No pertinent family history.    Social History     Socioeconomic History    Marital status: Single   Tobacco Use    Smoking status: Never    Smokeless tobacco: Current     Types: Chew   Vaping Use    Vaping status: Never Used   Substance and Sexual Activity    Alcohol use: Not Currently    Drug use: Never       Duane A Centers Jr.  reports that he has never smoked. His smokeless tobacco use includes chew. I have educated him on the risk of diseases from " using tobacco products such as cancer, COPD, and heart disease.         I spent  1  minutes counseling the patient.              There were no vitals taken for this visit.    PHYSICAL EXAM  Physical Exam   Constitutional: He is oriented to person, place, and time. He appears well-developed and well-nourished. No distress.   HENT:   Head: Normocephalic and atraumatic.   Right Ear: Hearing normal.   Left Ear: Hearing normal.   Mouth/Throat: Mouth/Lips are normal.  Eyes: Conjunctivae and lids are normal.   Pulmonary/Chest: Effort normal.  No respiratory distress.  Musculoskeletal:      Lumbar back: He exhibits decreased range of motion, tenderness and spasm. He exhibits no swelling.        Back:       Comments: Slight decreased ROM   Neurological: He is alert and oriented to person, place, and time.   Psychiatric: He has a normal mood and affect. His speech is normal and behavior is normal.           Diagnoses and all orders for this visit:    1. Chronic bilateral low back pain with bilateral sciatica (Primary)    Other orders  -     TiZANidine (Zanaflex) 2 MG capsule; Take 1 capsule by mouth 3 (Three) Times a Day As Needed for Muscle Spasms for up to 7 days.  Dispense: 21 capsule; Refill: 0    Rest  Increase fluids  Exercises sent to patient   Ice, IBU(reports he has IBU) and tylenol for pain   PCP if symptoms continue   Declines steroid  ER for any worsening symptoms such as high fever, loss of bowel or bladder or saddle anesthesia.       FOLLOW-UP  As discussed during visit with PCP/[facility] if no improvement or Urgent Care/Emergency Department if worsening of symptoms    Patient verbalizes understanding of medication dosage, comfort measures, instructions for treatment and follow-up.    Vanessa Khan, JAGDISH  01/30/2025  04:30 EST    Mode of Visit: Video  Location of patient: -HOME-  Location of provider: +HOME+  You have chosen to receive care through a telehealth visit.  The patient has signed the video  visit consent form.  The visit included audio and video interaction. No technical issues occurred during this visit.      The use of a video visit has been reviewed with the patient and verbal informed consent has been obtained. Myself and Duane A Centers Jr. participated in this visit. The patient is located in [address] [place] [address].   I am located in [place], [place]. [employer] and [employer] Video Client were utilized. I spent 5 minutes in the patient's chart for this visit.         Note Disclaimer: At [facility], we believe that sharing information builds trust and better   relationships. You are receiving this note because you recently visited [facility]. It is possible you   will see health information before a provider has talked with you about it. This kind of information can   be easy to misunderstand. To help you fully understand what it means for your health, we urge you to   discuss this note with your provider.

## 2025-01-30 NOTE — LETTER
January 30, 2025     Patient: Duane A Centers Jr.   YOB: 1990   Date of Visit: 1/30/2025       To Whom It May Concern:    It is my medical opinion that Duane Centers be excused from work on 1-30-25 and 1-31-25 may return to work/school on 2-1-25 if symptoms improved            Sincerely,  Vanessa DUBON       URGENT CARE VIDEO VISIT PROVIDER    CC: No Recipients

## 2025-03-14 ENCOUNTER — TELEMEDICINE (OUTPATIENT)
Dept: FAMILY MEDICINE CLINIC | Age: 35
End: 2025-03-14
Payer: COMMERCIAL

## 2025-03-14 VITALS — BODY MASS INDEX: 44.1 KG/M2 | HEIGHT: 71 IN | WEIGHT: 315 LBS

## 2025-03-14 DIAGNOSIS — E66.813 CLASS 3 SEVERE OBESITY DUE TO EXCESS CALORIES WITH SERIOUS COMORBIDITY AND BODY MASS INDEX (BMI) OF 45.0 TO 49.9 IN ADULT: Primary | ICD-10-CM

## 2025-03-14 DIAGNOSIS — F33.0 MAJOR DEPRESSIVE DISORDER, RECURRENT EPISODE, MILD DEGREE: ICD-10-CM

## 2025-03-14 DIAGNOSIS — G89.29 CHRONIC BILATERAL LOW BACK PAIN WITH BILATERAL SCIATICA: ICD-10-CM

## 2025-03-14 DIAGNOSIS — M54.41 CHRONIC BILATERAL LOW BACK PAIN WITH BILATERAL SCIATICA: ICD-10-CM

## 2025-03-14 DIAGNOSIS — M46.1 INFLAMMATION OF SACROILIAC JOINT: ICD-10-CM

## 2025-03-14 DIAGNOSIS — D68.59 OTHER PRIMARY THROMBOPHILIA: ICD-10-CM

## 2025-03-14 DIAGNOSIS — M54.42 CHRONIC BILATERAL LOW BACK PAIN WITH BILATERAL SCIATICA: ICD-10-CM

## 2025-03-14 DIAGNOSIS — E66.01 CLASS 3 SEVERE OBESITY DUE TO EXCESS CALORIES WITH SERIOUS COMORBIDITY AND BODY MASS INDEX (BMI) OF 45.0 TO 49.9 IN ADULT: Primary | ICD-10-CM

## 2025-03-14 DIAGNOSIS — F41.1 GENERALIZED ANXIETY DISORDER: ICD-10-CM

## 2025-03-14 PROCEDURE — 99214 OFFICE O/P EST MOD 30 MIN: CPT | Performed by: FAMILY MEDICINE

## 2025-03-14 RX ORDER — ARIPIPRAZOLE 2 MG/1
2 TABLET ORAL NIGHTLY
Qty: 30 TABLET | Refills: 5 | Status: SHIPPED | OUTPATIENT
Start: 2025-03-14

## 2025-03-14 RX ORDER — ASPIRIN 81 MG/1
81 TABLET ORAL DAILY
COMMUNITY

## 2025-03-14 NOTE — ASSESSMENT & PLAN NOTE
Depression has been worse lately.  Back pain is likely exacerbating his symptoms.  I am going to add in Abilify 2 mg nightly to see if this helps with both mood and sleep.  Continue duloxetine 60 mg twice daily and bupropion 300 mg daily in the meantime.  I will see him back in a couple of months for close follow-up.  Orders:    ARIPiprazole (Abilify) 2 MG tablet; Take 1 tablet by mouth Every Night.

## 2025-03-14 NOTE — PROGRESS NOTES
"Chief Complaint  Depression, Anxiety (F/U), and Discuss weight loss medication    Mode of Visit: Video  Location of patient: -OTHER-: in car in parking lot at work  Location of provider[place] [facility]  You have chosen to receive care through a telehealth visit.  The patient has signed the video visit consent form.  The visit included audio and video interaction. No technical issues occurred during this visit.        Subjective Duane A Centers  presents to [facility] today for routine f/u of chronic issues.    He is on bupropion and duloxetine for depression and anxiety.  Mood has been \"worse.\"  He feels \"some days I'm just hanging on by a thread.\"  No motivation.  +Depressed mood.  No crying spells.  +Anhedonia.  No fatigue or decreased energy.  No restlessness.  Concentration is sporadic.  Poor sleep, maybe 3 hours per night.  Appetite varies -- sometimes he overeats and other days he has no appetite.  Passive wishes for death but no SI.  No plan.  He has been previously been on trazodone 50 mg QHS (ineffective).      He is on duloxetine for chronic low back pain with bilateral radiculopathy.  He was evaluated by [facility] in 8/2024 and they recommended he return to Pain Management for epidural injections.  He follows with RAY Macdonald for these.  He has previously been on baclofen and Celebrex.  A couple of weeks ago, he had lidocaine injections for his SI joint pain.  They are going to follow up with steroid injections.      He is potentially interested in GLP-1 agonist.  He has struggled to get in much exercise due to his back issues.        Current Outpatient Medications:     aspirin 81 MG EC tablet, Take 1 tablet by mouth Daily., Disp: , Rfl:     buPROPion XL (WELLBUTRIN XL) 300 MG 24 hr tablet, Take 1 tablet by mouth Daily., Disp: 90 tablet, Rfl: 1    DULoxetine (CYMBALTA) 60 MG capsule, TAKE 1 CAPSULE BY MOUTH TWICE DAILY, Disp: 180 capsule, Rfl: 1    " "ARIPiprazole (Abilify) 2 MG tablet, Take 1 tablet by mouth Every Night., Disp: 30 tablet, Rfl: 5  There are no discontinued medications.      Allergies:  Patient has no known allergies.      Objective   Vital Signs:   Vitals:    03/14/25 0844   Weight: (!) 160 kg (353 lb)  Comment: at home scale   Height: 179.1 cm (70.51\")     Body mass index is 49.92 kg/m².           Physical Exam  Constitutional:       Appearance: Normal appearance.   HENT:      Head: Normocephalic and atraumatic.   Eyes:      Extraocular Movements: Extraocular movements intact.      Conjunctiva/sclera: Conjunctivae normal.   Pulmonary:      Effort: Pulmonary effort is normal. No respiratory distress.   Musculoskeletal:         General: Normal range of motion.   Skin:     General: Skin is warm and dry.   Neurological:      General: No focal deficit present.      Mental Status: He is alert and oriented to person, place, and time.   Psychiatric:         Mood and Affect: Mood normal.         Behavior: Behavior normal.         Thought Content: Thought content normal.         Judgment: Judgment normal.           Lab Results   Component Value Date    GLUCOSE 85 08/19/2022    BUN 9 08/19/2022    CREATININE 0.94 08/19/2022    BCR 9.6 08/19/2022    K 4.5 08/19/2022    CO2 25.2 08/19/2022    CALCIUM 9.7 08/19/2022    ALBUMIN 4.60 08/19/2022    LABIL2 1.4 07/24/2019    AST 22 08/19/2022    ALT 26 08/19/2022       Lab Results   Component Value Date    CHOL 190 08/19/2022    TRIG 83 08/19/2022    HDL 42 08/19/2022     (H) 08/19/2022       Lab Results   Component Value Date    WBC 10.00 07/24/2019    HGB 13.8 07/24/2019    HCT 40.6 (L) 07/24/2019    MCV 85.3 07/24/2019     07/24/2019            Assessment and Plan    Assessment & Plan  Class 3 severe obesity due to excess calories with serious comorbidity and body mass index (BMI) of 45.0 to 49.9 in adult  Patient's (Body mass index is 49.92 kg/m².) indicates that they are morbidly/severely obese " (BMI > 40 or > 35 with obesity - related health condition) with health conditions that include none . Weight is worsening. BMI  is above average; BMI management plan is completed. We discussed portion control, increasing exercise, and pharmacologic options including GLP-1 agonist . He is interested in starting a GLP-1 agonist.  He does not have a diagnosis of diabetes.  We did discuss risks and benefits, including GI distress, pancreatitis, pancreatic and thyroid endocrine cancer.  He will contact his insurance to see if Wegovy, Saxenda or Zepbound would be covered.  He was instructed that he will need to continue efforts towards lifestyle change, which he has been doing.  He will call back to the office to let me know if he would like to start on one of the above agents, and I will send it in for him.          Major depressive disorder, recurrent episode, mild degree    Depression has been worse lately.  Back pain is likely exacerbating his symptoms.  I am going to add in Abilify 2 mg nightly to see if this helps with both mood and sleep.  Continue duloxetine 60 mg twice daily and bupropion 300 mg daily in the meantime.  I will see him back in a couple of months for close follow-up.  Orders:    ARIPiprazole (Abilify) 2 MG tablet; Take 1 tablet by mouth Every Night.    Generalized anxiety disorder    As per depression plan.   Orders:    ARIPiprazole (Abilify) 2 MG tablet; Take 1 tablet by mouth Every Night.    Other primary thrombophilia  S/p DVT. Course of anticoagulation completed. Evaluation completed by Hematology with recommendation that lifelong anticoagulation is not indicated at this time.        Inflammation of sacroiliac joint  Following with Pain Management.  Going for steroid injection in the near future.       Chronic bilateral low back pain with bilateral sciatica  Following with pain management.                 Follow Up   Return if symptoms worsen or fail to improve, for or as scheduled  8/1/2025.  Patient was given instructions and counseling regarding his condition or for health maintenance advice. Please see specific information pulled into the AVS if appropriate.           03/14/2025

## 2025-03-14 NOTE — ASSESSMENT & PLAN NOTE
As per depression plan.   Orders:    ARIPiprazole (Abilify) 2 MG tablet; Take 1 tablet by mouth Every Night.

## 2025-04-22 ENCOUNTER — E-VISIT (OUTPATIENT)
Dept: ADMINISTRATIVE | Facility: OTHER | Age: 35
End: 2025-04-22
Payer: COMMERCIAL

## 2025-04-22 NOTE — E-VISIT ESCALATED
Status: Referred Out  Date: 2025 03:10:38  Acuity Level: Within 1-2 weeks  Referral message:  We're sorry you are not feeling well. Your safety is important to us. Low back pain caused by muscle strains or overuse generally resolves without treatment in a few weeks. Back pain over 12 weeks ago is considered chronic pain which   will require multiple follow-ups. We would like for you to be seen in person to determine the cause of your symptoms and care that would be most effective for you.  For the most appropriate care, please be seen:   At a clinic  Within 1-2 weeks   You   won't be charged for this visit.&nbsp;We hope you feel better soon!   Patient: Duane Centers  Patient : 1990  Patient Address: [address] [place] [address]  Patient Phone: [phone]  Clinician Response: Unavailable  Diagnosis: Unavailable  Diagnosis ICD: Unavailable     Patient Interview Questions and Responses:  Clinical Protocol: Low back pain  Please select the reason for your visit today.: Low back pain  When did your back pain begin?: More than 12 weeks ago

## 2025-04-22 NOTE — E-VISIT ESCALATED
Status: Referred Out  Date: 2025 03:12:53  Acuity Level: Not applicable  Referral message: Based on the information you provided during your interview, eVisit is not appropriate for treating your condition.  Patient: Duane Centers  Patient : 1990  Patient Address: [address] [place] [address]  Patient Phone: [phone]  Clinician Response: Unavailable  Diagnosis: Unavailable  Diagnosis ICD: Unavailable     Patient Interview Questions and Responses: None available

## 2025-04-22 NOTE — E-VISIT ESCALATED
Status: Referred Out  Date: 2025 03:13:48  Acuity Level: Within 8 hours  Referral message:   We're sorry you are not feeling well. Your safety is important to us. Back pain with weakness in legs is a symptom of increased pressure on the nerves that lead to the legs. There are a number of different causes for the weakness. For   this reason, we would like for you to be seen in person to rule out a serious condition and determine the most effective treatment for you.  For the most appropriate care, please be seen:   At a clinic or an urgent care  Within 8 hours   If the weakness   is getting worse very quickly,   Call 911 or go to an emergency room   You won't be charged for this visit. We hope you feel better soon!    Patient: Duane Centers  Patient : 1990  Patient Address: [address] [place] [address]  Patient Phone: [phone]  Clinician Response: Unavailable  Diagnosis: Unavailable  Diagnosis ICD: Unavailable     Patient Interview Questions and Responses:  Clinical Protocol: Low back pain  Please select the reason for your visit today.: Low back pain  When did your back pain begin?: 1-6 days ago  Did your back pain begin suddenly?: No  Where is your back pain located? Select all that apply. Buttocks: Yes  Where is your back pain located? Select all that apply. Low back area: Yes  Where is your back pain located? Select all that apply. Mid back area: No  Where is your back pain located? Select all that apply. Upper back area: No  Where is your back pain located? Select all that apply. Neck: No  Some conditions are limited to one side of the body. This information will help identify a possible cause of your pain.Which side is the pain on?: Both sides  Please describe your back pain. Constant - it is the same all the time: Yes  Please describe your back pain. It varies depending on activity: Yes  Please describe your back pain. It goes away when resting: No  Please describe your back pain.  Can feel back muscles spasm at times: Yes  Does pain decrease when bending forward?: No  Please rate the severity of your back pain on a pain scale, with 0 being no pain and 10 being the worst pain imaginable.: 7-9 = Severe Pain (difficult to perform normal daily activities)  Does the back pain shoot to other areas?: No  Low back pain can be a symptom of other conditions. For this reason, we would like to know if there are other symptoms in addition to the back pain.Since the back pain began, have you noticed any of the following? Numbness or tingling in legs: Yes  Low back pain can be a symptom of other conditions. For this reason, we would like to know if there are other symptoms in addition to the back pain.Since the back pain began, have you noticed any of the following? Weakness in legs: Yes

## 2025-05-23 ENCOUNTER — TELEPHONE (OUTPATIENT)
Dept: FAMILY MEDICINE CLINIC | Age: 35
End: 2025-05-23
Payer: COMMERCIAL

## 2025-05-23 DIAGNOSIS — F41.1 GENERALIZED ANXIETY DISORDER: ICD-10-CM

## 2025-05-23 DIAGNOSIS — F33.0 MAJOR DEPRESSIVE DISORDER, RECURRENT EPISODE, MILD DEGREE: ICD-10-CM

## 2025-05-23 RX ORDER — ARIPIPRAZOLE 2 MG/1
2 TABLET ORAL NIGHTLY
Qty: 30 TABLET | Refills: 5 | OUTPATIENT
Start: 2025-05-23

## 2025-05-23 NOTE — TELEPHONE ENCOUNTER
Pt was called due to the appt that was no showed on 5/16/2025. This is the second documented no show of this calendar year. A voicemail was attempted to be left but the phone hung up. A letter will be sent to inform the pt of no showed appt and the no show policy.

## 2025-07-17 ENCOUNTER — TELEMEDICINE (OUTPATIENT)
Dept: FAMILY MEDICINE CLINIC | Facility: TELEHEALTH | Age: 35
End: 2025-07-17
Payer: COMMERCIAL

## 2025-07-17 DIAGNOSIS — U07.1 COVID-19: Primary | ICD-10-CM

## 2025-07-17 PROBLEM — M84.374A STRESS FRACTURE OF RIGHT FOOT: Status: ACTIVE | Noted: 2025-07-17

## 2025-07-17 PROBLEM — M79.671 PAIN IN RIGHT FOOT: Status: ACTIVE | Noted: 2025-07-17

## 2025-07-17 PROBLEM — M84.376A STRESS FRACTURE OF METATARSAL BONE: Status: ACTIVE | Noted: 2025-07-17

## 2025-07-17 PROCEDURE — 99213 OFFICE O/P EST LOW 20 MIN: CPT | Performed by: NURSE PRACTITIONER

## 2025-07-17 RX ORDER — BENZONATATE 100 MG/1
CAPSULE ORAL
Qty: 30 CAPSULE | Refills: 0 | Status: SHIPPED | OUTPATIENT
Start: 2025-07-17

## 2025-07-17 NOTE — PROGRESS NOTES
"You have chosen to receive care through a telehealth visit.  Do you consent to use a video/audio connection for your medical care today? Yes     CHIEF COMPLAINT  No chief complaint on file.        HPI  Duane A Centers Jr. is a 35 y.o. male  presents with complaint of + COVID test. Reports he took 2 COVID test both were positive. Reports aching, fever, cough, congestion and diarrhea. Reports symptoms started 2 days ago. Reports fever Tmax 101. Loss of smell.  Reports no chills. No nausea or vomiting. No CP or SOA. Reports he has taken Dayquil and Nyquil for his symptoms.     Review of Systems   Constitutional:  Positive for fever. Negative for chills and fatigue.   HENT:  Positive for congestion. Negative for ear discharge, ear pain, sinus pressure, sinus pain and sore throat.    Respiratory:  Positive for cough. Negative for chest tightness, shortness of breath and wheezing.    Cardiovascular:  Negative for chest pain.   Gastrointestinal:  Positive for diarrhea. Negative for abdominal pain, nausea and vomiting.   Musculoskeletal:  Positive for myalgias. Negative for back pain.   Neurological:  Negative for dizziness and headaches.   Psychiatric/Behavioral: Negative.         Past Medical History:   Diagnosis Date    Anxiety     Depression     Low back pain     Pulmonary embolism 2020    \"hereditary\"       Family History   Problem Relation Age of Onset    Depression Mother     Alcohol abuse Father     Depression Father     Heart disease Father     Hyperlipidemia Father     Alcohol abuse Maternal Grandfather     Alcohol abuse Paternal Uncle        Social History     Socioeconomic History    Marital status: Single   Tobacco Use    Smoking status: Never    Smokeless tobacco: Current     Types: Chew   Vaping Use    Vaping status: Never Used   Substance and Sexual Activity    Alcohol use: Not Currently     Alcohol/week: 30.0 standard drinks of alcohol     Types: 30 Cans of beer per week    Drug use: Never    Sexual " activity: Yes     Partners: Female     Birth control/protection: Condom       Duane A Kevyn Spring  reports that he has never smoked. His smokeless tobacco use includes chew. I have educated him on the risk of diseases from using tobacco products such as cancer, COPD, and heart disease.         I spent 1 minutes counseling the patient.              There were no vitals taken for this visit.    PHYSICAL EXAM  Physical Exam   Constitutional: He is oriented to person, place, and time. He appears well-developed and well-nourished. No distress.   HENT:   Head: Normocephalic and atraumatic.   Right Ear: Hearing normal.   Left Ear: Hearing normal.   Nose: Congestion present. Right sinus exhibits no maxillary sinus tenderness. Left sinus exhibits no maxillary sinus tenderness.   Mouth/Throat: Mouth/Lips are normal.Oropharynx is clear and moist. No oropharyngeal exudate or tonsillar abscesses.   Eyes: Conjunctivae and lids are normal.   Pulmonary/Chest: Effort normal.  No respiratory distress.  Abdominal: There is no abdominal tenderness.   Patient directed exam   Neurological: He is alert and oriented to person, place, and time.   Psychiatric: He has a normal mood and affect. His speech is normal and behavior is normal.           Diagnoses and all orders for this visit:    1. COVID-19 (Primary)    Other orders  -     benzonatate (Tessalon Perles) 100 MG capsule; Take 1 or 2 perles tid prn cough  Dispense: 30 capsule; Refill: 0    Rest  Fluids  PCP if symptoms continue   Declines need for inhaler  Spoke with patient regarding antiviral. EUA. Patient has not had labs since 2022. Patient denies having any labs since then. Discussed Molnupiravir. Sent patient information sheet on Molnupiravir.   ER for any worsening symptoms such as high fever, chest pain or SOA       FOLLOW-UP  As discussed during visit with PCP/[facility] if no improvement or Urgent Care/Emergency Department if worsening of symptoms    Patient verbalizes  understanding of medication dosage, comfort measures, instructions for treatment and follow-up.    Vanessa Khan, JAGDISH  07/17/2025  05:11 EDT    Mode of Visit: Video  Location of patient: -HOME-  Location of provider: +HOME+  You have chosen to receive care through a telehealth visit.  The patient has signed the video visit consent form.  The visit included audio and video interaction. No technical issues occurred during this visit.      The use of a video visit has been reviewed with the patient and verbal informed consent has been obtained. Myself and Duane A Centers Jr. participated in this visit. The patient is located in [address] [place] [address].   I am located in [place], [place]. [employer] were utilized. I spent 5 minutes in the patient's chart for this visit.         Note Disclaimer: At [facility], we believe that sharing information builds trust and better   relationships. You are receiving this note because you recently visited [facility]. It is possible you   will see health information before a provider has talked with you about it. This kind of information can   be easy to misunderstand. To help you fully understand what it means for your health, we urge you to   discuss this note with your provider.

## 2025-07-17 NOTE — LETTER
July 17, 2025     Patient: Duane A Centers Jr.   YOB: 1990   Date of Visit: 7/17/2025       To Whom It May Concern:    It is my medical opinion that Duane Centers be excused from work and may return to work/school on 7/21/25 if fever free and symptoms improved.            Sincerely,    Vanessa DUBON     URGENT CARE VIDEO VISIT PROVIDER    CC: No Recipients         [id number]

## 2025-07-17 NOTE — PATIENT INSTRUCTIONS
Cough, Adult  Coughing is a reflex that clears your throat and airways (respiratory system). It helps heal and protect your lungs. It is normal to cough from time to time. A cough that happens with other symptoms or that lasts a long time may be a sign of a condition that needs treatment. A short-term (acute) cough may only last 2-3 weeks. A long-term (chronic) cough may last 8 or more weeks.  Coughing is often caused by:  Diseases, such as:  An infection of the respiratory system.  Asthma or other heart or lung diseases.  Gastroesophageal reflux. This is when acid comes back up from the stomach.  Breathing in things that irritate your lungs.  Allergies.  Postnasal drip. This is when mucus runs down the back of your throat.  Smoking.  Some medicines.  Follow these instructions at home:  Medicines  Take over-the-counter and prescription medicines only as told by your health care provider.  Talk with your provider before you take cough medicine (cough suppressants).  Eating and drinking  Do not drink alcohol.  Avoid caffeine.  Drink enough fluid to keep your pee (urine) pale yellow.  Lifestyle  Avoid cigarette smoke.  Do not use any products that contain nicotine or tobacco. These products include cigarettes, chewing tobacco, and vaping devices, such as e-cigarettes. If you need help quitting, ask your provider.  Avoid things that make you cough. These may include perfumes, candles, cleaning products, or campfire smoke.  General instructions    Watch for any changes to your cough. Tell your provider about them.  Always cover your mouth when you cough.  If the air is dry in your bedroom or home, use a cool mist vaporizer or humidifier.  If your cough is worse at night, try to sleep in a semi-upright position.  Rest as needed.  Contact a health care provider if:  You have new symptoms, or your symptoms get worse.  You cough up pus.  You have a fever that does not go away or a cough that does not get better after 2-3  weeks.  You cannot control your cough with medicine, and you are losing sleep.  You have pain that gets worse or is not helped with medicine.  You lose weight for no clear reason.  You have night sweats.  Get help right away if:  You cough up blood.  You have trouble breathing.  Your heart is beating very fast.  These symptoms may be an emergency. Get help right away. Call 911.  Do not wait to see if the symptoms will go away.  Do not drive yourself to the hospital.  This information is not intended to replace advice given to you by your health care provider. Make sure you discuss any questions you have with your health care provider.  Document Revised: 08/18/2023 Document Reviewed: 08/18/2023  Elsevier Patient Education © 2024 [employer]

## 2025-08-07 ENCOUNTER — TELEPHONE (OUTPATIENT)
Dept: FAMILY MEDICINE CLINIC | Age: 35
End: 2025-08-07
Payer: COMMERCIAL

## 2025-08-14 ENCOUNTER — TELEMEDICINE (OUTPATIENT)
Dept: FAMILY MEDICINE CLINIC | Facility: TELEHEALTH | Age: 35
End: 2025-08-14
Payer: COMMERCIAL

## 2025-08-14 DIAGNOSIS — M54.42 CHRONIC BILATERAL LOW BACK PAIN WITH BILATERAL SCIATICA: Primary | ICD-10-CM

## 2025-08-14 DIAGNOSIS — G89.29 CHRONIC BILATERAL LOW BACK PAIN WITH BILATERAL SCIATICA: Primary | ICD-10-CM

## 2025-08-14 DIAGNOSIS — M54.41 CHRONIC BILATERAL LOW BACK PAIN WITH BILATERAL SCIATICA: Primary | ICD-10-CM

## 2025-08-14 PROCEDURE — 99213 OFFICE O/P EST LOW 20 MIN: CPT | Performed by: NURSE PRACTITIONER

## 2025-08-14 RX ORDER — TIZANIDINE HYDROCHLORIDE 2 MG/1
2 CAPSULE, GELATIN COATED ORAL 3 TIMES DAILY PRN
Qty: 21 CAPSULE | Refills: 0 | Status: SHIPPED | OUTPATIENT
Start: 2025-08-14

## 2025-08-19 ENCOUNTER — TRANSCRIBE ORDERS (OUTPATIENT)
Dept: ADMINISTRATIVE | Facility: HOSPITAL | Age: 35
End: 2025-08-19
Payer: COMMERCIAL

## 2025-08-19 DIAGNOSIS — M47.816 LUMBAR SPONDYLOSIS: Primary | ICD-10-CM

## 2025-08-21 DIAGNOSIS — F41.1 GENERALIZED ANXIETY DISORDER: ICD-10-CM

## 2025-08-21 DIAGNOSIS — F33.0 MAJOR DEPRESSIVE DISORDER, RECURRENT EPISODE, MILD DEGREE: ICD-10-CM

## 2025-08-21 RX ORDER — DULOXETIN HYDROCHLORIDE 60 MG/1
60 CAPSULE, DELAYED RELEASE ORAL 2 TIMES DAILY
Qty: 180 CAPSULE | Refills: 1 | Status: SHIPPED | OUTPATIENT
Start: 2025-08-21

## 2025-08-22 ENCOUNTER — READMISSION MANAGEMENT (OUTPATIENT)
Dept: CALL CENTER | Facility: HOSPITAL | Age: 35
End: 2025-08-22
Payer: COMMERCIAL

## 2025-08-22 ENCOUNTER — TELEPHONE (OUTPATIENT)
Dept: FAMILY MEDICINE CLINIC | Age: 35
End: 2025-08-22
Payer: COMMERCIAL

## 2025-08-25 ENCOUNTER — TRANSITIONAL CARE MANAGEMENT TELEPHONE ENCOUNTER (OUTPATIENT)
Dept: CALL CENTER | Facility: HOSPITAL | Age: 35
End: 2025-08-25
Payer: COMMERCIAL

## 2025-08-26 ENCOUNTER — TRANSITIONAL CARE MANAGEMENT TELEPHONE ENCOUNTER (OUTPATIENT)
Dept: CALL CENTER | Facility: HOSPITAL | Age: 35
End: 2025-08-26
Payer: COMMERCIAL

## 2025-08-26 ENCOUNTER — HOSPITAL ENCOUNTER (OUTPATIENT)
Dept: MRI IMAGING | Facility: HOSPITAL | Age: 35
Discharge: HOME | End: 2025-08-26
Admitting: NURSE PRACTITIONER
Payer: COMMERCIAL

## 2025-08-26 DIAGNOSIS — F33.0 MAJOR DEPRESSIVE DISORDER, RECURRENT EPISODE, MILD DEGREE: ICD-10-CM

## 2025-08-26 DIAGNOSIS — M47.816 LUMBAR SPONDYLOSIS: ICD-10-CM

## 2025-08-26 PROCEDURE — 72148 MRI LUMBAR SPINE W/O DYE: CPT

## 2025-08-26 RX ORDER — BUPROPION HYDROCHLORIDE 300 MG/1
300 TABLET ORAL DAILY
Qty: 90 TABLET | Refills: 1 | Status: SHIPPED | OUTPATIENT
Start: 2025-08-26

## 2025-08-27 ENCOUNTER — OFFICE VISIT (OUTPATIENT)
Dept: FAMILY MEDICINE CLINIC | Age: 35
End: 2025-08-27
Payer: COMMERCIAL

## 2025-08-27 VITALS
HEART RATE: 121 BPM | WEIGHT: 315 LBS | DIASTOLIC BLOOD PRESSURE: 106 MMHG | OXYGEN SATURATION: 97 % | HEIGHT: 71 IN | BODY MASS INDEX: 44.1 KG/M2 | TEMPERATURE: 99.2 F | SYSTOLIC BLOOD PRESSURE: 145 MMHG

## 2025-08-27 DIAGNOSIS — D68.59 OTHER PRIMARY THROMBOPHILIA: ICD-10-CM

## 2025-08-27 DIAGNOSIS — R07.89 OTHER CHEST PAIN: Primary | ICD-10-CM

## 2025-08-27 DIAGNOSIS — R03.0 ELEVATED BP WITHOUT DIAGNOSIS OF HYPERTENSION: ICD-10-CM

## 2025-08-27 RX ORDER — SACCHAROMYCES BOULARDII 250 MG
250 CAPSULE ORAL
COMMUNITY
Start: 2025-08-22 | End: 2025-08-29

## 2025-08-27 RX ORDER — AMOXICILLIN 500 MG/1
1000 CAPSULE ORAL 3 TIMES DAILY
COMMUNITY
Start: 2025-08-22 | End: 2025-08-27